# Patient Record
Sex: FEMALE | Race: WHITE | NOT HISPANIC OR LATINO | Employment: OTHER | URBAN - NONMETROPOLITAN AREA
[De-identification: names, ages, dates, MRNs, and addresses within clinical notes are randomized per-mention and may not be internally consistent; named-entity substitution may affect disease eponyms.]

---

## 2019-09-13 ENCOUNTER — APPOINTMENT (OUTPATIENT)
Dept: GENERAL RADIOLOGY | Facility: HOSPITAL | Age: 74
End: 2019-09-13
Attending: PHYSICIAN ASSISTANT
Payer: COMMERCIAL

## 2019-09-13 ENCOUNTER — HOSPITAL ENCOUNTER (EMERGENCY)
Facility: HOSPITAL | Age: 74
Discharge: HOME | End: 2019-09-13
Attending: PHYSICIAN ASSISTANT | Admitting: PHYSICIAN ASSISTANT
Payer: COMMERCIAL

## 2019-09-13 VITALS
DIASTOLIC BLOOD PRESSURE: 79 MMHG | OXYGEN SATURATION: 98 % | RESPIRATION RATE: 16 BRPM | SYSTOLIC BLOOD PRESSURE: 162 MMHG | TEMPERATURE: 98.1 F

## 2019-09-13 DIAGNOSIS — S62.101A CLOSED FRACTURE OF RIGHT WRIST, INITIAL ENCOUNTER: Primary | ICD-10-CM

## 2019-09-13 PROCEDURE — G0463 HOSPITAL OUTPT CLINIC VISIT: HCPCS

## 2019-09-13 PROCEDURE — 73110 X-RAY EXAM OF WRIST: CPT | Mod: TC,RT

## 2019-09-13 PROCEDURE — 99202 OFFICE O/P NEW SF 15 MIN: CPT | Mod: Z6 | Performed by: PHYSICIAN ASSISTANT

## 2019-09-13 ASSESSMENT — ENCOUNTER SYMPTOMS
COLOR CHANGE: 1
FEVER: 0
WOUND: 0
JOINT SWELLING: 0

## 2019-09-13 NOTE — ED PROVIDER NOTES
History     Chief Complaint   Patient presents with     Wrist Pain     HPI  Freya Lee is a 74 year old female who presents to urgent care for evaluation of right wrist pain.  Patient states that she slipped and fell last Wednesday, approximately 7 days ago and tried to catch herself with her right hand.  Patient states that she had pain immediately in her right wrist but did not come in for evaluation stating that she thought it was just a sprain.  Patient has been icing it since and states she has developed some bruising on her right forearm.  Patient has also been wearing her wrist brace for support.  Patient denies any elbow pain, shoulder pain, previous right upper extremity fractures or surgeries.    Allergies:  Allergies   Allergen Reactions     Penicillins      Sulfa Drugs        Problem List:    Patient Active Problem List    Diagnosis Date Noted     Aftercare for healing traumatic fracture of lower arm 01/28/2008     Priority: Medium        Past Medical History:    No past medical history on file.    Past Surgical History:    No past surgical history on file.    Family History:    No family history on file.    Social History:  Marital Status:  Single [1]  Social History     Tobacco Use     Smoking status: Not on file   Substance Use Topics     Alcohol use: Not on file     Drug use: Not on file        Medications:      No current outpatient medications on file.      Review of Systems   Constitutional: Negative for fever.   Musculoskeletal: Negative for joint swelling.        See HPI   Skin: Positive for color change. Negative for rash and wound.       Physical Exam   BP: 162/79  Heart Rate: 65  Temp: 98.1  F (36.7  C)  Resp: 16  SpO2: 98 %      Physical Exam   Constitutional: She is oriented to person, place, and time. She appears well-developed and well-nourished. No distress.   Cardiovascular: Regular rhythm and normal heart sounds.   Pulmonary/Chest: Effort normal and breath sounds normal. No  respiratory distress.   Musculoskeletal:        Right wrist: She exhibits tenderness and bony tenderness. She exhibits normal range of motion, no swelling and no effusion.   Patient has tenderness with palpation over distal aspect of right radius.  There is a large bruise present over the dorsal aspect of right forearm extending from the distal third to the proximal third.   Neurological: She is alert and oriented to person, place, and time.   Nursing note and vitals reviewed.      ED Course        Procedures              Critical Care time:               Results for orders placed or performed during the hospital encounter of 09/13/19 (from the past 24 hour(s))   XR Wrist Right 3 Views    Narrative    PROCEDURE:  XR WRIST RT G/E 3 VW    HISTORY: FOOSH/ pain over distal radius    COMPARISON:  None.    TECHNIQUE:  4 views of the right wrist were obtained.    FINDINGS:  There is abnormal widening of the scapholunate space  consistent with scapholunate ligament disruption. There is joint space  narrowing and subchondral sclerosis at the radial carpal articulation.  Arthritic changes are seen at the scaphotrapezium scaphotrapezoid and  trapezium first metacarpal articulations. There is some mild deformity  of the distal radius and ulnar styloid consistent with old injury.       Impression    IMPRESSION: Scapholunate ligament disruption right wrist      ADEBAYO HATFIELD MD       Medications - No data to display    Assessments & Plan (with Medical Decision Making)   #1.  Closed fracture of right wrist with ligament disruption.    Discussed exam findings as well as x-ray result with patient.  I called and spoke with a Dr. [facility] about the patient.  [facility] will be contacting patient to schedule appointment.  Patient was splinted in urgent care today.  Any further concern in the meantime please return to emergency department.  Patient verbalizes understanding and agreement of  plan.          I have reviewed the nursing notes.    I have reviewed the findings, diagnosis, plan and need for follow up with the patient.      New Prescriptions    No medications on file       Final diagnoses:   Closed fracture of right wrist, initial encounter       9/13/2019   [facility]     Tom Hoffman PA-C  09/13/19 1204

## 2019-09-13 NOTE — ED AVS SNAPSHOT
[facility]  [address] [address]  Phone:  [phone]                                    Freya Lee   MRN: [id number]    Department:  [facility]   Date of Visit:  9/13/2019           After Visit Summary Signature Page    I have received my discharge instructions, and my questions have been answered. I have discussed any challenges I see with this plan with the nurse or doctor.    ..........................................................................................................................................  Patient/Patient Representative Signature      ..........................................................................................................................................  Patient Representative Print Name and Relationship to Patient    ..................................................               ................................................  Date                                   Time    ..........................................................................................................................................  Reviewed by Signature/Title    ...................................................              ..............................................  Date                                               Time          22EPIC Rev 08/18

## 2019-09-13 NOTE — DISCHARGE INSTRUCTIONS
Follow[facility] with [facility]: Referral has been placed to   If you have not heard from them by this afternoon, call in to schedule your appointment: [phone]

## 2019-09-13 NOTE — ED TRIAGE NOTES
"Patient states about 1 week ago she fell and injured her right wrist.  States her friends and family have been \"on her\" about going in for an xray.  States the swelling has improved and the pain is better but she thought she should come in and have it checked \"just in case\"  "

## 2019-09-13 NOTE — ED TRIAGE NOTES
"States fell x1.5 weeks ago and injured R wrist. States pain is getting better, but \"family made me come in\". Pt wearing wrist brace from home. States has not been taking anything for pain  "

## 2019-09-13 NOTE — ED NOTES
"Tried to page Dr. Alcantar or TRISTIN Alcocer,  states they were in surgery this am, and \"should be in the clinic later this afternoon\" at an unknown time.  "

## 2021-02-05 ENCOUNTER — MEDICAL CORRESPONDENCE (OUTPATIENT)
Dept: HEALTH INFORMATION MANAGEMENT | Facility: CLINIC | Age: 76
End: 2021-02-05

## 2021-03-30 ENCOUNTER — TRANSFERRED RECORDS (OUTPATIENT)
Dept: HEALTH INFORMATION MANAGEMENT | Facility: CLINIC | Age: 76
End: 2021-03-30

## 2021-04-06 ENCOUNTER — TRANSFERRED RECORDS (OUTPATIENT)
Dept: HEALTH INFORMATION MANAGEMENT | Facility: CLINIC | Age: 76
End: 2021-04-06

## 2023-11-29 ENCOUNTER — TELEPHONE (OUTPATIENT)
Dept: FAMILY MEDICINE | Facility: OTHER | Age: 78
End: 2023-11-29

## 2023-11-29 NOTE — TELEPHONE ENCOUNTER
Pt stated that she currently has [employer] insurance but will be switching to [employer] on JAN 1, 2024. I went ahead and scheduled est care for the second per pt. Pt stated that she will call to get this rescheduled if she does not have [employer] the first week of janurary

## 2023-12-29 PROBLEM — M25.512 ACUTE PAIN OF LEFT SHOULDER: Status: ACTIVE | Noted: 2018-04-23

## 2023-12-29 PROBLEM — M62.81 MUSCLE WEAKNESS: Status: ACTIVE | Noted: 2018-04-23

## 2023-12-29 PROBLEM — S06.0XAA CONCUSSION: Status: ACTIVE | Noted: 2017-11-16

## 2023-12-29 PROBLEM — R79.89 ABNORMAL CBC: Status: ACTIVE | Noted: 2017-04-25

## 2023-12-29 PROBLEM — S06.6XAA TRAUMATIC SUBARACHNOID HEMORRHAGE (H): Status: ACTIVE | Noted: 2017-11-16

## 2023-12-29 PROBLEM — M54.2 NECK PAIN: Status: ACTIVE | Noted: 2018-04-23

## 2023-12-29 PROBLEM — S01.01XA SCALP LACERATION: Status: ACTIVE | Noted: 2017-11-16

## 2023-12-29 PROBLEM — F07.81 POST CONCUSSION SYNDROME: Status: ACTIVE | Noted: 2018-02-08

## 2023-12-29 RX ORDER — LEVOTHYROXINE SODIUM 50 UG/1
TABLET ORAL
COMMUNITY
Start: 2023-11-28 | End: 2024-06-03

## 2023-12-29 RX ORDER — FLAXSEED OIL
15 OIL (ML) MISCELLANEOUS
COMMUNITY
End: 2024-01-02

## 2023-12-29 RX ORDER — ASCORBIC ACID 500 MG
500 TABLET ORAL
COMMUNITY
End: 2024-09-03

## 2023-12-29 RX ORDER — SERTRALINE HYDROCHLORIDE 100 MG/1
TABLET, FILM COATED ORAL
COMMUNITY
Start: 2023-08-09 | End: 2024-01-02

## 2023-12-29 RX ORDER — DORZOLAMIDE HYDROCHLORIDE AND TIMOLOL MALEATE 20; 5 MG/ML; MG/ML
1 SOLUTION/ DROPS OPHTHALMIC
COMMUNITY
End: 2024-03-01

## 2023-12-29 RX ORDER — LATANOPROST 50 UG/ML
SOLUTION/ DROPS OPHTHALMIC
COMMUNITY
Start: 2023-09-29

## 2023-12-29 NOTE — PROGRESS NOTES
Assessment & Plan     Encounter to establish care  Ok to establish care    Depression with anxiety  No current symptoms  Normal kidney and liver function   She will let us know if her depression and anxiety re-occur.  She does well with Zoloft when she notices increased depression   - Comprehensive metabolic panel (BMP + Alb, Alk Phos, ALT, AST, Total. Bili, TP); Future  - Comprehensive metabolic panel (BMP + Alb, Alk Phos, ALT, AST, Total. Bili, TP)    Vitamin D deficiency  Takes vitamin D regularly - updating lab today   - Vitamin D Deficiency; Future  - Vitamin D Deficiency    Acquired hypothyroidism  Normal TSH leve - continue current dose and recheck yearly or as needed   - TSH with free T4 reflex; Future  - TSH with free T4 reflex    [facility]  [facility]  Ordering of each unique test         See Patient Instructions    Return in about 4 weeks (around 1/30/2024) for BP Recheck.    STEFAN Shearer [facility] - UYEN Pillai is a 78 year old, presenting for the following health issues:  Establish Care, Anxiety, and Depression        1/2/2024     8:34 AM   Additional Questions   Roomed by Sundeep Barahona CMA   Accompanied by Self         1/2/2024     8:34 AM   Patient Reported Additional Medications   Patient reports taking the following new medications New patient       HPI     Establish Care  Previous care at [facility]     Mom is still alive 102 years old     Depression and Anxiety   How are you doing with your depression since your last visit? Improved   How are you doing with your anxiety since your last visit?  Improved   Are you having other symptoms that might be associated with depression or anxiety? No  Have you had a significant life event? No   Do you have any concerns with your use of alcohol or other drugs? No  States she uses intermittently as needed.  She will try to be off  for a few years and then restart as needed  Currently no need for treatment   Social History     Tobacco Use    Smoking status: Never     Passive exposure: Never    Smokeless tobacco: Never   Vaping Use    Vaping Use: Never used         1/2/2024     8:33 AM   PHQ   PHQ-9 Total Score 0   Q9: Thoughts of better off dead/self-harm past 2 weeks Not at all         1/2/2024     8:34 AM   BETH-7 SCORE   Total Score 0 (minimal anxiety)   Total Score 0         1/2/2024     8:33 AM   Last PHQ-9   1.  Little interest or pleasure in doing things 0   2.  Feeling down, depressed, or hopeless 0   3.  Trouble falling or staying asleep, or sleeping too much 0   4.  Feeling tired or having little energy 0   5.  Poor appetite or overeating 0   6.  Feeling bad about yourself 0   7.  Trouble concentrating 0   8.  Moving slowly or restless 0   Q9: Thoughts of better off dead/self-harm past 2 weeks 0   PHQ-9 Total Score 0         1/2/2024     8:34 AM   BETH-7    1. Feeling nervous, anxious, or on edge 0   2. Not being able to stop or control worrying 0   3. Worrying too much about different things 0   4. Trouble relaxing 0   5. Being so restless that it is hard to sit still 0   6. Becoming easily annoyed or irritable 0   7. Feeling afraid, as if something awful might happen 0   BETH-7 Total Score 0   If you checked any problems, how difficult have they made it for you to do your work, take care of things at home, or get along with other people? Not difficult at all       Suicide Assessment Five-step Evaluation and Treatment (SAFE-T)    Hypothyroidism     Since last visit, patient describes the following symptoms: Weight stable, no hair loss, no skin changes, no constipation, no loose stools        Review of Systems   CONSTITUTIONAL:COVID a few weeks ago - symptoms are improving   INTEGUMENTARY/SKIN: dry skin   EYES: NEGATIVE for vision changes or irritation  ENT/MOUTH: NEGATIVE for ear, mouth and throat problems  RESP:NEGATIVE for  "significant cough or SOB  CV: NEGATIVE for chest pain, palpitations or peripheral edema  GI: NEGATIVE for nausea, abdominal pain, heartburn, or change in bowel habits  : denies dysuria   MUSCULOSKELETAL: occasional pain - takes vitamins   NEURO: NEGATIVE for weakness, dizziness or paresthesias  ENDOCRINE: Hx thyroid disease  PSYCHIATRIC: NEGATIVE for changes in mood or affect      Objective    BP (!) 152/80   Pulse 74   Temp 97  F (36.1  C) (Tympanic)   Ht 1.67 m (5' 5.75\")   Wt 67.1 kg (148 lb)   SpO2 98%   BMI 24.07 kg/m    Body mass index is 24.07 kg/m .  Physical Exam   GENERAL: healthy, alert and no distress  EYES: Eyes grossly normal to inspection, PERRL and conjunctivae and sclerae normal  HENT: ear canals and TM's normal, nose and mouth without ulcers or lesions  NECK: no adenopathy, no asymmetry, masses, or scars and thyroid normal to palpation  RESP: lungs clear to auscultation - no rales, rhonchi or wheezes  CV: regular rate and rhythm, normal S1 S2, no S3 or S4, no murmur, click or rub, no peripheral edema and peripheral pulses strong  ABDOMEN: soft, nontender, no hepatosplenomegaly, no masses and bowel sounds normal  MS: no gross musculoskeletal defects noted, no edema  SKIN: no suspicious lesions or rashes  NEURO: Normal strength and tone, sensory exam grossly normal, mentation intact, speech normal, and cranial nerves 2-12 intact  PSYCH: mentation appears normal, affect normal/bright  LYMPH: normal ant/post cervical, supraclavicular nodes    Results for orders placed or performed in visit on 01/02/24   TSH with free T4 reflex     Status: Normal   Result Value Ref Range    TSH 2.54 0.30 - 4.20 uIU/mL   Comprehensive metabolic panel (BMP + Alb, Alk Phos, ALT, AST, Total. Bili, TP)     Status: Normal   Result Value Ref Range    Sodium 140 135 - 145 mmol/L    Potassium 4.2 3.4 - 5.3 mmol/L    Carbon Dioxide (CO2) 25 22 - 29 mmol/L    Anion Gap 10 7 - 15 mmol/L    Urea Nitrogen 15.0 8.0 - 23.0 " mg/dL    Creatinine 0.73 0.51 - 0.95 mg/dL    GFR Estimate 83 >60 mL/min/1.73m2    Calcium 9.3 8.8 - 10.2 mg/dL    Chloride 105 98 - 107 mmol/L    Glucose 94 70 - 99 mg/dL    Alkaline Phosphatase 93 40 - 150 U/L    AST 27 0 - 45 U/L    ALT 19 0 - 50 U/L    Protein Total 7.2 6.4 - 8.3 g/dL    Albumin 4.3 3.5 - 5.2 g/dL    Bilirubin Total 1.0 <=1.2 mg/dL                   Answers submitted by the patient for this visit:  Patient Health Questionnaire (Submitted on 1/2/2024)  If you checked off any problems, how difficult have these problems made it for you to do your work, take care of things at home, or get along with other people?: Not difficult at all  PHQ9 TOTAL SCORE: 0  BETH-7 (Submitted on 1/2/2024)  BETH 7 TOTAL SCORE: 0

## 2024-01-02 ENCOUNTER — OFFICE VISIT (OUTPATIENT)
Dept: FAMILY MEDICINE | Facility: OTHER | Age: 79
End: 2024-01-02
Attending: NURSE PRACTITIONER
Payer: COMMERCIAL

## 2024-01-02 VITALS
OXYGEN SATURATION: 98 % | SYSTOLIC BLOOD PRESSURE: 152 MMHG | HEART RATE: 74 BPM | WEIGHT: 148 LBS | HEIGHT: 66 IN | TEMPERATURE: 97 F | BODY MASS INDEX: 23.78 KG/M2 | DIASTOLIC BLOOD PRESSURE: 80 MMHG

## 2024-01-02 DIAGNOSIS — Z76.89 ENCOUNTER TO ESTABLISH CARE: Primary | ICD-10-CM

## 2024-01-02 DIAGNOSIS — E55.9 VITAMIN D DEFICIENCY: ICD-10-CM

## 2024-01-02 DIAGNOSIS — F41.8 DEPRESSION WITH ANXIETY: ICD-10-CM

## 2024-01-02 DIAGNOSIS — E03.9 ACQUIRED HYPOTHYROIDISM: ICD-10-CM

## 2024-01-02 PROBLEM — K80.50 BILIARY COLIC: Status: ACTIVE | Noted: 2021-02-24

## 2024-01-02 LAB
ALBUMIN SERPL BCG-MCNC: 4.3 G/DL (ref 3.5–5.2)
ALP SERPL-CCNC: 93 U/L (ref 40–150)
ALT SERPL W P-5'-P-CCNC: 19 U/L (ref 0–50)
ANION GAP SERPL CALCULATED.3IONS-SCNC: 10 MMOL/L (ref 7–15)
AST SERPL W P-5'-P-CCNC: 27 U/L (ref 0–45)
BILIRUB SERPL-MCNC: 1 MG/DL
BUN SERPL-MCNC: 15 MG/DL (ref 8–23)
CALCIUM SERPL-MCNC: 9.3 MG/DL (ref 8.8–10.2)
CHLORIDE SERPL-SCNC: 105 MMOL/L (ref 98–107)
CREAT SERPL-MCNC: 0.73 MG/DL (ref 0.51–0.95)
DEPRECATED HCO3 PLAS-SCNC: 25 MMOL/L (ref 22–29)
EGFRCR SERPLBLD CKD-EPI 2021: 83 ML/MIN/1.73M2
GLUCOSE SERPL-MCNC: 94 MG/DL (ref 70–99)
POTASSIUM SERPL-SCNC: 4.2 MMOL/L (ref 3.4–5.3)
PROT SERPL-MCNC: 7.2 G/DL (ref 6.4–8.3)
SODIUM SERPL-SCNC: 140 MMOL/L (ref 135–145)
TSH SERPL DL<=0.005 MIU/L-ACNC: 2.54 UIU/ML (ref 0.3–4.2)
VIT D+METAB SERPL-MCNC: 64 NG/ML (ref 20–50)

## 2024-01-02 PROCEDURE — G0463 HOSPITAL OUTPT CLINIC VISIT: HCPCS | Performed by: NURSE PRACTITIONER

## 2024-01-02 PROCEDURE — 99204 OFFICE O/P NEW MOD 45 MIN: CPT | Performed by: NURSE PRACTITIONER

## 2024-01-02 PROCEDURE — 80053 COMPREHEN METABOLIC PANEL: CPT | Mod: ZL | Performed by: NURSE PRACTITIONER

## 2024-01-02 PROCEDURE — 36415 COLL VENOUS BLD VENIPUNCTURE: CPT | Mod: ZL | Performed by: NURSE PRACTITIONER

## 2024-01-02 PROCEDURE — 82306 VITAMIN D 25 HYDROXY: CPT | Mod: ZL | Performed by: NURSE PRACTITIONER

## 2024-01-02 PROCEDURE — 84443 ASSAY THYROID STIM HORMONE: CPT | Mod: ZL | Performed by: NURSE PRACTITIONER

## 2024-01-02 RX ORDER — ASPIRIN 81 MG/1
81 TABLET, CHEWABLE ORAL
COMMUNITY
End: 2024-03-01

## 2024-01-02 ASSESSMENT — ANXIETY QUESTIONNAIRES
2. NOT BEING ABLE TO STOP OR CONTROL WORRYING: NOT AT ALL
GAD7 TOTAL SCORE: 0
IF YOU CHECKED OFF ANY PROBLEMS ON THIS QUESTIONNAIRE, HOW DIFFICULT HAVE THESE PROBLEMS MADE IT FOR YOU TO DO YOUR WORK, TAKE CARE OF THINGS AT HOME, OR GET ALONG WITH OTHER PEOPLE: NOT DIFFICULT AT ALL
7. FEELING AFRAID AS IF SOMETHING AWFUL MIGHT HAPPEN: NOT AT ALL
6. BECOMING EASILY ANNOYED OR IRRITABLE: NOT AT ALL
GAD7 TOTAL SCORE: 0
5. BEING SO RESTLESS THAT IT IS HARD TO SIT STILL: NOT AT ALL
3. WORRYING TOO MUCH ABOUT DIFFERENT THINGS: NOT AT ALL
8. IF YOU CHECKED OFF ANY PROBLEMS, HOW DIFFICULT HAVE THESE MADE IT FOR YOU TO DO YOUR WORK, TAKE CARE OF THINGS AT HOME, OR GET ALONG WITH OTHER PEOPLE?: NOT DIFFICULT AT ALL
GAD7 TOTAL SCORE: 0
7. FEELING AFRAID AS IF SOMETHING AWFUL MIGHT HAPPEN: NOT AT ALL
4. TROUBLE RELAXING: NOT AT ALL
1. FEELING NERVOUS, ANXIOUS, OR ON EDGE: NOT AT ALL

## 2024-01-02 ASSESSMENT — PATIENT HEALTH QUESTIONNAIRE - PHQ9
SUM OF ALL RESPONSES TO PHQ QUESTIONS 1-9: 0
10. IF YOU CHECKED OFF ANY PROBLEMS, HOW DIFFICULT HAVE THESE PROBLEMS MADE IT FOR YOU TO DO YOUR WORK, TAKE CARE OF THINGS AT HOME, OR GET ALONG WITH OTHER PEOPLE: NOT DIFFICULT AT ALL
SUM OF ALL RESPONSES TO PHQ QUESTIONS 1-9: 0

## 2024-01-02 NOTE — PATIENT INSTRUCTIONS
LIFESTYLE CHANGES    Eat a Healthy diet  Eat more vegetables/plants in your diet  Eat health fats  [place] oil  avocados  Eat healthy proteins  Poultry without the skin  Fish  Limit red meat  Nuts - limit to 1/4 cup per day   Increase physical activity  Slowly work up to 30 minutes of physical activity most days of the week  Aerobic activity 150 minute a week  2 days of resistance exercising         Try daily yoga and meditation

## 2024-01-31 ENCOUNTER — OFFICE VISIT (OUTPATIENT)
Dept: FAMILY MEDICINE | Facility: OTHER | Age: 79
End: 2024-01-31
Attending: NURSE PRACTITIONER
Payer: COMMERCIAL

## 2024-01-31 VITALS
TEMPERATURE: 97.2 F | DIASTOLIC BLOOD PRESSURE: 70 MMHG | BODY MASS INDEX: 24.4 KG/M2 | HEART RATE: 84 BPM | OXYGEN SATURATION: 94 % | WEIGHT: 150 LBS | SYSTOLIC BLOOD PRESSURE: 150 MMHG

## 2024-01-31 DIAGNOSIS — L98.9 FACIAL LESION: ICD-10-CM

## 2024-01-31 DIAGNOSIS — I10 BENIGN ESSENTIAL HYPERTENSION: Primary | ICD-10-CM

## 2024-01-31 DIAGNOSIS — R00.2 PALPITATIONS: ICD-10-CM

## 2024-01-31 PROBLEM — H43.819 POSTERIOR VITREOUS DETACHMENT: Status: ACTIVE | Noted: 2023-06-03

## 2024-01-31 PROBLEM — H40.1120 PRIMARY OPEN ANGLE GLAUCOMA OF LEFT EYE: Status: ACTIVE | Noted: 2023-08-17

## 2024-01-31 PROCEDURE — 99214 OFFICE O/P EST MOD 30 MIN: CPT | Performed by: NURSE PRACTITIONER

## 2024-01-31 PROCEDURE — G0463 HOSPITAL OUTPT CLINIC VISIT: HCPCS

## 2024-01-31 RX ORDER — LISINOPRIL 20 MG/1
20 TABLET ORAL DAILY
Qty: 90 TABLET | Refills: 1 | Status: SHIPPED | OUTPATIENT
Start: 2024-01-31 | End: 2024-06-03 | Stop reason: SINTOL

## 2024-01-31 RX ORDER — PROPRANOLOL HYDROCHLORIDE 20 MG/1
20 TABLET ORAL 2 TIMES DAILY PRN
Qty: 30 TABLET | Refills: 1 | Status: SHIPPED | OUTPATIENT
Start: 2024-01-31

## 2024-01-31 RX ORDER — TURMERIC 95 %
POWDER (GRAM) MISCELLANEOUS
COMMUNITY

## 2024-01-31 RX ORDER — SERTRALINE HYDROCHLORIDE 100 MG/1
TABLET, FILM COATED ORAL
COMMUNITY
End: 2024-01-31

## 2024-01-31 NOTE — PROGRESS NOTES
Assessment & Plan     Benign essential hypertension  Blood pressure has remained slightly elevated even when she checks at home  Plan to start ACEi - discussed medication and side effects  Provided written information   - lisinopril (ZESTRIL) 20 MG tablet; Take 1 tablet (20 mg) by mouth daily    Facial lesion  Chronic dry peeling patch on face - would like to have dermatology check her skin   - Adult Dermatology  Referral; Future    Palpitations  Freya has had some increased anxiety in the evenings  Ok to try propranolol.  We discussed checking an echo or additional heart studies, but she would like to start with symptom management.  I feel this is reasonable she has had some increased stress.  If symptoms do not improve consider echo and possible Ziopatch   - propranolol (INDERAL) 20 MG tablet; Take 1 tablet (20 mg) by mouth 2 times daily as needed (palpitations or racing heart)        See Patient Instructions    No follow-ups on file.    Subjective   Freya is a 78 year old, presenting for the following health issues:  Hypertension    HPI     Hypertension Follow-up    Do you check your blood pressure regularly outside of the clinic? No   Are you following a low salt diet? No  Are your blood pressures ever more than 140 on the top number (systolic) OR more   than 90 on the bottom number (diastolic), for example 140/90? No  She has noticed some headaches           Review of Systems  CONSTITUTIONAL:hot flashes  INTEGUMENTARY/SKIN: facial lesion under right eye   EYES: NEGATIVE for vision changes or irritation  ENT/MOUTH: chronic dry   RESP: NEGATIVE for significant cough or SOB  CV: occasional palpitation   GI: NEGATIVE for nausea, abdominal pain, heartburn, or change in bowel habits   female: denies dysuria   MUSCULOSKELETAL:NEGATIVE for significant arthralgias or myalgia  NEURO: occasional headache   ENDOCRINE: Hx thyroid disease  PSYCHIATRIC: increased stress recently       Objective    BP (!) 150/70    Pulse 84   Temp 97.2  F (36.2  C) (Tympanic)   Wt 68 kg (150 lb)   SpO2 94%   BMI 24.40 kg/m    Body mass index is 24.4 kg/m .  Physical Exam   GENERAL: alert and no distress  RESP: lungs clear to auscultation - no rales, rhonchi or wheezes  CV: regular rate and rhythm, normal S1 S2, no S3 or S4, no murmur, click or rub, no peripheral edema  ABDOMEN: soft, nontender, no hepatosplenomegaly, no masses and bowel sounds normal  PSYCH: mentation appears normal, affect normal/bright    Office Visit on 01/02/2024   Component Date Value Ref Range Status    Vitamin D, Total (25-Hydroxy) 01/02/2024 64 (H)  20 - 50 ng/mL Final    indicates supplementation, with increased risk of hypercalciuria    TSH 01/02/2024 2.54  0.30 - 4.20 uIU/mL Final    Sodium 01/02/2024 140  135 - 145 mmol/L Final    Reference intervals for this test were updated on 09/26/2023 to more accurately reflect our healthy population. There may be differences in the flagging of prior results with similar values performed with this method. Interpretation of those prior results can be made in the context of the updated reference intervals.     Potassium 01/02/2024 4.2  3.4 - 5.3 mmol/L Final    Carbon Dioxide (CO2) 01/02/2024 25  22 - 29 mmol/L Final    Anion Gap 01/02/2024 10  7 - 15 mmol/L Final    Urea Nitrogen 01/02/2024 15.0  8.0 - 23.0 mg/dL Final    Creatinine 01/02/2024 0.73  0.51 - 0.95 mg/dL Final    GFR Estimate 01/02/2024 83  >60 mL/min/1.73m2 Final    Calcium 01/02/2024 9.3  8.8 - 10.2 mg/dL Final    Chloride 01/02/2024 105  98 - 107 mmol/L Final    Glucose 01/02/2024 94  70 - 99 mg/dL Final    Alkaline Phosphatase 01/02/2024 93  40 - 150 U/L Final    Reference intervals for this test were updated on 11/14/2023 to more accurately reflect our healthy population. There may be differences in the flagging of prior results with similar values performed with this method. Interpretation of those prior results can be made in the context of the  updated reference intervals.    AST 01/02/2024 27  0 - 45 U/L Final    Reference intervals for this test were updated on 6/12/2023 to more accurately reflect our healthy population. There may be differences in the flagging of prior results with similar values performed with this method. Interpretation of those prior results can be made in the context of the updated reference intervals.    ALT 01/02/2024 19  0 - 50 U/L Final    Reference intervals for this test were updated on 6/12/2023 to more accurately reflect our healthy population. There may be differences in the flagging of prior results with similar values performed with this method. Interpretation of those prior results can be made in the context of the updated reference intervals.      Protein Total 01/02/2024 7.2  6.4 - 8.3 g/dL Final    Albumin 01/02/2024 4.3  3.5 - 5.2 g/dL Final    Bilirubin Total 01/02/2024 1.0  <=1.2 mg/dL Final           Signed Electronically by: STEFAN Shearer CNP

## 2024-01-31 NOTE — PATIENT INSTRUCTIONS
Start taking lisinopril daily for your blood pressure     Use propranolol up to 2 times a day for palpitations or racing heart

## 2024-02-29 NOTE — PROGRESS NOTES
"  Assessment & Plan     Benign essential hypertension  Stable continue current medication     Glaucoma suspect, bilateral  Refilled - she is going to reschedule for ophthalmologist   - dorzolamide-timolol (COSOPT) 2-0.5 % ophthalmic solution; Place 1 drop into both eyes 2 times daily          See Patient Instructions    Return in about 3 months (around 6/1/2024) for BP Recheck.    Saul Pillai is a 78 year old, presenting for the following health issues:  Hypertension        1/2/2024     8:34 AM   Additional Questions   Roomed by Sundeep Barahona CMA   Accompanied by Self     HPI     Hypertension Follow-up  Propranolol 20 mg 2 times a day - as needed   Lisinopril 20 mg daily   Do you check your blood pressure regularly outside of the clinic? Yes   Are you following a low salt diet? No  Are your blood pressures ever more than 140 on the top number (systolic) OR more   than 90 on the bottom number (diastolic), for example 140/90? Yes              Review of Systems  CONSTITUTIONAL: NEGATIVE for fever, chills, change in weight  RESP: NEGATIVE for significant cough or SOB  CV: NEGATIVE for chest pain, palpitations or peripheral edema      Objective    /70 (BP Location: Left arm, Patient Position: Sitting, Cuff Size: Adult Regular)   Pulse 69   Temp 98.4  F (36.9  C) (Temporal)   Ht 1.651 m (5' 5\")   Wt 68 kg (150 lb)   SpO2 97%   BMI 24.96 kg/m    Body mass index is 24.96 kg/m .  Physical Exam   GENERAL: alert and no distress  RESP: lungs clear to auscultation - no rales, rhonchi or wheezes  CV: regular rate and rhythm, normal S1 S2, no S3 or S4, no murmur, click or rub, no peripheral edema  ABDOMEN: soft, nontender, no hepatosplenomegaly, no masses and bowel sounds normal  PSYCH: mentation appears normal, affect normal/bright    Office Visit on 01/02/2024   Component Date Value Ref Range Status    Vitamin D, Total (25-Hydroxy) 01/02/2024 64 (H)  20 - 50 ng/mL Final    indicates supplementation, with " increased risk of hypercalciuria    TSH 01/02/2024 2.54  0.30 - 4.20 uIU/mL Final    Sodium 01/02/2024 140  135 - 145 mmol/L Final    Reference intervals for this test were updated on 09/26/2023 to more accurately reflect our healthy population. There may be differences in the flagging of prior results with similar values performed with this method. Interpretation of those prior results can be made in the context of the updated reference intervals.     Potassium 01/02/2024 4.2  3.4 - 5.3 mmol/L Final    Carbon Dioxide (CO2) 01/02/2024 25  22 - 29 mmol/L Final    Anion Gap 01/02/2024 10  7 - 15 mmol/L Final    Urea Nitrogen 01/02/2024 15.0  8.0 - 23.0 mg/dL Final    Creatinine 01/02/2024 0.73  0.51 - 0.95 mg/dL Final    GFR Estimate 01/02/2024 83  >60 mL/min/1.73m2 Final    Calcium 01/02/2024 9.3  8.8 - 10.2 mg/dL Final    Chloride 01/02/2024 105  98 - 107 mmol/L Final    Glucose 01/02/2024 94  70 - 99 mg/dL Final    Alkaline Phosphatase 01/02/2024 93  40 - 150 U/L Final    Reference intervals for this test were updated on 11/14/2023 to more accurately reflect our healthy population. There may be differences in the flagging of prior results with similar values performed with this method. Interpretation of those prior results can be made in the context of the updated reference intervals.    AST 01/02/2024 27  0 - 45 U/L Final    Reference intervals for this test were updated on 6/12/2023 to more accurately reflect our healthy population. There may be differences in the flagging of prior results with similar values performed with this method. Interpretation of those prior results can be made in the context of the updated reference intervals.    ALT 01/02/2024 19  0 - 50 U/L Final    Reference intervals for this test were updated on 6/12/2023 to more accurately reflect our healthy population. There may be differences in the flagging of prior results with similar values performed with this method. Interpretation of those  prior results can be made in the context of the updated reference intervals.      Protein Total 01/02/2024 7.2  6.4 - 8.3 g/dL Final    Albumin 01/02/2024 4.3  3.5 - 5.2 g/dL Final    Bilirubin Total 01/02/2024 1.0  <=1.2 mg/dL Final           Signed Electronically by: STEFAN Shearer CNP

## 2024-03-01 ENCOUNTER — OFFICE VISIT (OUTPATIENT)
Dept: FAMILY MEDICINE | Facility: OTHER | Age: 79
End: 2024-03-01
Attending: NURSE PRACTITIONER
Payer: COMMERCIAL

## 2024-03-01 VITALS
TEMPERATURE: 98.4 F | WEIGHT: 150 LBS | OXYGEN SATURATION: 97 % | DIASTOLIC BLOOD PRESSURE: 70 MMHG | HEIGHT: 65 IN | HEART RATE: 69 BPM | SYSTOLIC BLOOD PRESSURE: 134 MMHG | BODY MASS INDEX: 24.99 KG/M2

## 2024-03-01 DIAGNOSIS — I10 BENIGN ESSENTIAL HYPERTENSION: Primary | ICD-10-CM

## 2024-03-01 DIAGNOSIS — H40.003 GLAUCOMA SUSPECT, BILATERAL: ICD-10-CM

## 2024-03-01 PROCEDURE — 99213 OFFICE O/P EST LOW 20 MIN: CPT | Performed by: NURSE PRACTITIONER

## 2024-03-01 PROCEDURE — G0463 HOSPITAL OUTPT CLINIC VISIT: HCPCS

## 2024-03-01 RX ORDER — FLUOCINONIDE TOPICAL SOLUTION USP, 0.05% 0.5 MG/ML
SOLUTION TOPICAL DAILY
COMMUNITY
Start: 2024-02-12 | End: 2024-09-03

## 2024-03-01 RX ORDER — DORZOLAMIDE HYDROCHLORIDE AND TIMOLOL MALEATE 20; 5 MG/ML; MG/ML
1 SOLUTION/ DROPS OPHTHALMIC 2 TIMES DAILY
Qty: 10 ML | Refills: 2 | Status: SHIPPED | OUTPATIENT
Start: 2024-03-01

## 2024-03-01 ASSESSMENT — PAIN SCALES - GENERAL: PAINLEVEL: NO PAIN (0)

## 2024-03-01 NOTE — PATIENT INSTRUCTIONS
Continue current treatment  Follow up with any questions or concerns      LIFESTYLE CHANGES    Eat a Healthy diet  Eat more vegetables/plants in your diet  Eat health fats  [place] oil  avocados  Eat healthy proteins  Poultry without the skin  Fish  Limit red meat  Nuts - limit to 1/4 cup per day   Increase physical activity  Slowly work up to 30 minutes of physical activity most days of the week  Aerobic activity 150 minute a week  2 days of resistance exercising         Try daily yoga and meditation

## 2024-06-03 ENCOUNTER — OFFICE VISIT (OUTPATIENT)
Dept: FAMILY MEDICINE | Facility: OTHER | Age: 79
End: 2024-06-03
Attending: NURSE PRACTITIONER
Payer: COMMERCIAL

## 2024-06-03 VITALS
HEIGHT: 65 IN | HEART RATE: 79 BPM | DIASTOLIC BLOOD PRESSURE: 88 MMHG | BODY MASS INDEX: 23.69 KG/M2 | SYSTOLIC BLOOD PRESSURE: 130 MMHG | TEMPERATURE: 98.7 F | WEIGHT: 142.2 LBS | OXYGEN SATURATION: 98 %

## 2024-06-03 DIAGNOSIS — M54.50 RIGHT-SIDED LOW BACK PAIN WITHOUT SCIATICA, UNSPECIFIED CHRONICITY: ICD-10-CM

## 2024-06-03 DIAGNOSIS — I10 BENIGN ESSENTIAL HYPERTENSION: Primary | ICD-10-CM

## 2024-06-03 DIAGNOSIS — E03.9 ACQUIRED HYPOTHYROIDISM: ICD-10-CM

## 2024-06-03 PROCEDURE — G0463 HOSPITAL OUTPT CLINIC VISIT: HCPCS

## 2024-06-03 PROCEDURE — 99214 OFFICE O/P EST MOD 30 MIN: CPT | Performed by: NURSE PRACTITIONER

## 2024-06-03 PROCEDURE — G2211 COMPLEX E/M VISIT ADD ON: HCPCS | Performed by: NURSE PRACTITIONER

## 2024-06-03 RX ORDER — LEVOTHYROXINE SODIUM 50 UG/1
50 TABLET ORAL DAILY
Qty: 90 TABLET | Refills: 3 | Status: SHIPPED | OUTPATIENT
Start: 2024-06-03

## 2024-06-03 RX ORDER — METHOCARBAMOL 500 MG/1
500 TABLET, FILM COATED ORAL 4 TIMES DAILY PRN
Qty: 30 TABLET | Refills: 1 | Status: SHIPPED | OUTPATIENT
Start: 2024-06-03

## 2024-06-03 RX ORDER — LOSARTAN POTASSIUM 50 MG/1
50 TABLET ORAL DAILY
Qty: 90 TABLET | Refills: 1 | Status: SHIPPED | OUTPATIENT
Start: 2024-06-03

## 2024-06-03 ASSESSMENT — PAIN SCALES - GENERAL: PAINLEVEL: MILD PAIN (2)

## 2024-06-03 NOTE — PROGRESS NOTES
Assessment & Plan     Benign essential hypertension  ACE cough  Switch to ARB  She was having good control of her blood pressure with lisinopril, but with side effects will change medication   - losartan (COZAAR) 50 MG tablet; Take 1 tablet (50 mg) by mouth daily    Acquired hypothyroidism  Refilled medication  Reviewed TSH - last check normal   - levothyroxine (SYNTHROID/LEVOTHROID) 50 MCG tablet; Take 1 tablet (50 mcg) by mouth daily    Right-sided low back pain without sciatica, unspecified chronicity  Intermittent low back pain over SI joint with swelling  Discussed self care  Ok to try a muscle relaxant.  She may try at night due to the side effects of drowsiness   - methocarbamol (ROBAXIN) 500 MG tablet; Take 1 tablet (500 mg) by mouth 4 times daily as needed        The longitudinal plan of care for the diagnosis(es)/condition(s) as documented were addressed during this visit. Due to the added complexity in care, I will continue to support Freya in the subsequent management and with ongoing continuity of care.    See Patient Instructions    No follow-ups on file.    Subjective   Freya is a 79 year old, presenting for the following health issues:  Hypertension    HPI     Hypertension Follow-up    Do you check your blood pressure regularly outside of the clinic? No   Are you following a low salt diet? Yes  Are your blood pressures ever more than 140 on the top number (systolic) OR more   than 90 on the bottom number (diastolic), for example 140/90? No  Cough from lisinopril - will switch to an ARB         Review of Systems  CONSTITUTIONAL: NEGATIVE for fever, chills, change in weight  RESP: NEGATIVE for significant cough or SOB  CV: NEGATIVE for chest pain, palpitations or peripheral edema  MUSCULOSKELETAL: right sided lower back pain   NEURO: some numbness into fingers       Objective    /88 (BP Location: Right arm, Patient Position: Sitting, Cuff Size: Adult Regular)   Pulse 79   Temp 98.7  F (37.1  " C) (Tympanic)   Ht 1.651 m (5' 5\")   Wt 64.5 kg (142 lb 3.2 oz)   SpO2 98%   BMI 23.66 kg/m    Body mass index is 23.66 kg/m .  Physical Exam   GENERAL: alert and no distress  RESP: lungs clear to auscultation - no rales, rhonchi or wheezes  CV: regular rate and rhythm, normal S1 S2, no S3 or S4, no murmur, click or rub, no peripheral edema  ABDOMEN: soft, nontender, no hepatosplenomegaly, no masses and bowel sounds normal  MS: tenderness to palpation right lower back   NEURO: Normal strength and tone, sensory exam grossly normal, mentation intact, and guarded movement to stand     Office Visit on 01/02/2024   Component Date Value Ref Range Status    Vitamin D, Total (25-Hydroxy) 01/02/2024 64 (H)  20 - 50 ng/mL Final    indicates supplementation, with increased risk of hypercalciuria    TSH 01/02/2024 2.54  0.30 - 4.20 uIU/mL Final    Sodium 01/02/2024 140  135 - 145 mmol/L Final    Reference intervals for this test were updated on 09/26/2023 to more accurately reflect our healthy population. There may be differences in the flagging of prior results with similar values performed with this method. Interpretation of those prior results can be made in the context of the updated reference intervals.     Potassium 01/02/2024 4.2  3.4 - 5.3 mmol/L Final    Carbon Dioxide (CO2) 01/02/2024 25  22 - 29 mmol/L Final    Anion Gap 01/02/2024 10  7 - 15 mmol/L Final    Urea Nitrogen 01/02/2024 15.0  8.0 - 23.0 mg/dL Final    Creatinine 01/02/2024 0.73  0.51 - 0.95 mg/dL Final    GFR Estimate 01/02/2024 83  >60 mL/min/1.73m2 Final    Calcium 01/02/2024 9.3  8.8 - 10.2 mg/dL Final    Chloride 01/02/2024 105  98 - 107 mmol/L Final    Glucose 01/02/2024 94  70 - 99 mg/dL Final    Alkaline Phosphatase 01/02/2024 93  40 - 150 U/L Final    Reference intervals for this test were updated on 11/14/2023 to more accurately reflect our healthy population. There may be differences in the flagging of prior results with similar values " performed with this method. Interpretation of those prior results can be made in the context of the updated reference intervals.    AST 01/02/2024 27  0 - 45 U/L Final    Reference intervals for this test were updated on 6/12/2023 to more accurately reflect our healthy population. There may be differences in the flagging of prior results with similar values performed with this method. Interpretation of those prior results can be made in the context of the updated reference intervals.    ALT 01/02/2024 19  0 - 50 U/L Final    Reference intervals for this test were updated on 6/12/2023 to more accurately reflect our healthy population. There may be differences in the flagging of prior results with similar values performed with this method. Interpretation of those prior results can be made in the context of the updated reference intervals.      Protein Total 01/02/2024 7.2  6.4 - 8.3 g/dL Final    Albumin 01/02/2024 4.3  3.5 - 5.2 g/dL Final    Bilirubin Total 01/02/2024 1.0  <=1.2 mg/dL Final           Signed Electronically by: STEFAN Shearer CNP

## 2024-09-03 ENCOUNTER — OFFICE VISIT (OUTPATIENT)
Dept: FAMILY MEDICINE | Facility: OTHER | Age: 79
End: 2024-09-03
Attending: NURSE PRACTITIONER
Payer: COMMERCIAL

## 2024-09-03 VITALS
BODY MASS INDEX: 23.21 KG/M2 | HEIGHT: 65 IN | DIASTOLIC BLOOD PRESSURE: 70 MMHG | TEMPERATURE: 97.4 F | SYSTOLIC BLOOD PRESSURE: 132 MMHG | OXYGEN SATURATION: 99 % | WEIGHT: 139.3 LBS | HEART RATE: 70 BPM

## 2024-09-03 DIAGNOSIS — I83.893 VARICOSE VEINS OF BILATERAL LOWER EXTREMITIES WITH OTHER COMPLICATIONS: ICD-10-CM

## 2024-09-03 DIAGNOSIS — I10 BENIGN ESSENTIAL HYPERTENSION: Primary | ICD-10-CM

## 2024-09-03 DIAGNOSIS — F32.0 CURRENT MILD EPISODE OF MAJOR DEPRESSIVE DISORDER WITHOUT PRIOR EPISODE (H): ICD-10-CM

## 2024-09-03 DIAGNOSIS — L30.9 DERMATITIS: ICD-10-CM

## 2024-09-03 PROBLEM — S06.6XAA TRAUMATIC SUBARACHNOID HEMORRHAGE (H): Status: RESOLVED | Noted: 2017-11-16 | Resolved: 2024-09-03

## 2024-09-03 PROCEDURE — G2211 COMPLEX E/M VISIT ADD ON: HCPCS | Performed by: NURSE PRACTITIONER

## 2024-09-03 PROCEDURE — G0463 HOSPITAL OUTPT CLINIC VISIT: HCPCS

## 2024-09-03 PROCEDURE — 99214 OFFICE O/P EST MOD 30 MIN: CPT | Performed by: NURSE PRACTITIONER

## 2024-09-03 RX ORDER — TRIAMCINOLONE ACETONIDE 1 MG/G
CREAM TOPICAL 2 TIMES DAILY
Qty: 80 G | Refills: 1 | Status: SHIPPED | OUTPATIENT
Start: 2024-09-03

## 2024-09-03 ASSESSMENT — PAIN SCALES - GENERAL: PAINLEVEL: NO PAIN (0)

## 2024-09-03 NOTE — PATIENT INSTRUCTIONS
Try compression socks for legs      LIFESTYLE CHANGES  Mediterranean style eating/plant based diet     Increase fiber intake   Protein goal (weight/2.2)  X  0.8-1.2     Eat a Healthy diet  Eat more vegetables/plants in your diet (1/2 your food should be vegetables)  Eat health fats  Lumpkin oil  avocados  Eat healthy proteins  Poultry without the skin  Fish  Limit red meat  Nuts - limit to 1/4 cup per day   Increase physical activity  Slowly work up to 30 minutes of physical activity most days of the week  Aerobic activity 150 minute a week  2 days of resistance exercising   Move every 30-60 minutes         Try daily yoga and meditation and relaxation breathing

## 2024-09-03 NOTE — PROGRESS NOTES
Assessment & Plan     Benign essential hypertension  Stable   Continue with losartan - cough cleared when she stopped lisinopril     Current mild episode of major depressive disorder without prior episode (H24)  Improved     Dermatitis  Rash right lower leg   Ok to use cream as needed   - triamcinolone (KENALOG) 0.1 % external cream; Apply topically 2 times daily.    Varicose veins of bilateral lower extremities with other complications  Try compression socks for varicose veins.  If pain continues consider referral to vascular surgery     The longitudinal plan of care for the diagnosis(es)/condition(s) as documented were addressed during this visit. Due to the added complexity in care, I will continue to support Freya in the subsequent management and with ongoing continuity of care.        See Patient Instructions    No follow-ups on file.    Subjective   Freya is a 79 year old, presenting for the following health issues:  Hypertension, Depression, and Thyroid Problem        9/3/2024    10:35 AM   Additional Questions   Roomed by dominique talley   Accompanied by self         9/3/2024    10:35 AM   Patient Reported Additional Medications   Patient reports taking the following new medications none     History of Present Illness       Mental Health Follow-up:  Patient presents to follow-up on Depression.Patient's depression since last visit has been:  Good  The patient is not having other symptoms associated with depression.      Any significant life events: grief or loss  Patient is not feeling anxious or having panic attacks.  Patient has no concerns about alcohol or drug use.    Hypertension: She presents for follow up of hypertension.  She does not check blood pressure  regularly outside of the clinic. Outpatient blood pressures have not been over 140/90. She does not follow a low salt diet.     Hypothyroidism:     Since last visit, patient describes the following symptoms::  Dry skin, Hair loss and Loose  stools    Reason for visit:  Blood pressure    She eats 2-3 servings of fruits and vegetables daily.She consumes 0 sweetened beverage(s) daily.She exercises with enough effort to increase her heart rate 60 or more minutes per day.  She exercises with enough effort to increase her heart rate 3 or less days per week.   She is taking medications regularly.       Hypertension Follow-up  Switched from ACEi to ARB last visit   Do you check your blood pressure regularly outside of the clinic? No   Are you following a low salt diet? No  Are your blood pressures ever more than 140 on the top number (systolic) OR more   than 90 on the bottom number (diastolic), for example 140/90? No    Depression   How are you doing with your depression since your last visit? Improved   Are you having other symptoms that might be associated with depression? No  Have you had a significant life event?  Grief or Loss   Are you feeling anxious or having panic attacks?   No  Do you have any concerns with your use of alcohol or other drugs? No    Social History     Tobacco Use    Smoking status: Never     Passive exposure: Never    Smokeless tobacco: Never   Vaping Use    Vaping status: Never Used         1/2/2024     8:33 AM   PHQ   PHQ-9 Total Score 0   Q9: Thoughts of better off dead/self-harm past 2 weeks Not at all         1/2/2024     8:34 AM   BETH-7 SCORE   Total Score 0 (minimal anxiety)   Total Score 0         1/2/2024     8:33 AM   Last PHQ-9   1.  Little interest or pleasure in doing things 0   2.  Feeling down, depressed, or hopeless 0   3.  Trouble falling or staying asleep, or sleeping too much 0   4.  Feeling tired or having little energy 0   5.  Poor appetite or overeating 0   6.  Feeling bad about yourself 0   7.  Trouble concentrating 0   8.  Moving slowly or restless 0   Q9: Thoughts of better off dead/self-harm past 2 weeks 0   PHQ-9 Total Score 0         1/2/2024     8:34 AM   BETH-7    1. Feeling nervous, anxious, or on edge 0  "  2. Not being able to stop or control worrying 0   3. Worrying too much about different things 0   4. Trouble relaxing 0   5. Being so restless that it is hard to sit still 0   6. Becoming easily annoyed or irritable 0   7. Feeling afraid, as if something awful might happen 0   BETH-7 Total Score 0   If you checked any problems, how difficult have they made it for you to do your work, take care of things at home, or get along with other people? Not difficult at all       Suicide Assessment Five-step Evaluation and Treatment (SAFE-T)    Hypothyroidism Follow-up    Since last visit, patient describes the following symptoms: dry skin, loose stools, and hair loss      Review of Systems  CONSTITUTIONAL: NEGATIVE for fever, chills, change in weight  INTEGUMENTARY/SKIN: hx eczema - rash on legs again and upper lip   EYES: Hx glaucoma  RESP: NEGATIVE for significant cough or SOB  CV: POSITIVE for varicose veins and NEGATIVE for chest pain/chest pressure and palpitations  GI: Hx IBS  : denies dysuria   MUSCULOSKELETAL: NEGATIVE for significant arthralgias or myalgia  NEURO: dizziness   PSYCHIATRIC: NEGATIVE for changes in mood or affect      Objective    /70 (BP Location: Left arm, Patient Position: Sitting, Cuff Size: Adult Regular)   Pulse 70   Temp 97.4  F (36.3  C) (Tympanic)   Ht 1.651 m (5' 5\")   Wt 63.2 kg (139 lb 4.8 oz)   SpO2 99%   BMI 23.18 kg/m    Body mass index is 23.18 kg/m .  Physical Exam   GENERAL: alert and no distress  RESP: lungs clear to auscultation - no rales, rhonchi or wheezes  CV: regular rate and rhythm, normal S1 S2, no S3 or S4, no murmur, click or rub, no peripheral edema  ABDOMEN: soft, nontender, no hepatosplenomegaly, no masses and bowel sounds normal  MS: no gross musculoskeletal defects noted, no edema  SKIN: itchy rash dry erythema patches right lower leg   PSYCH: mentation appears normal, affect normal/bright    Office Visit on 01/02/2024   Component Date Value Ref Range " Status    Vitamin D, Total (25-Hydroxy) 01/02/2024 64 (H)  20 - 50 ng/mL Final    indicates supplementation, with increased risk of hypercalciuria    TSH 01/02/2024 2.54  0.30 - 4.20 uIU/mL Final    Sodium 01/02/2024 140  135 - 145 mmol/L Final    Reference intervals for this test were updated on 09/26/2023 to more accurately reflect our healthy population. There may be differences in the flagging of prior results with similar values performed with this method. Interpretation of those prior results can be made in the context of the updated reference intervals.     Potassium 01/02/2024 4.2  3.4 - 5.3 mmol/L Final    Carbon Dioxide (CO2) 01/02/2024 25  22 - 29 mmol/L Final    Anion Gap 01/02/2024 10  7 - 15 mmol/L Final    Urea Nitrogen 01/02/2024 15.0  8.0 - 23.0 mg/dL Final    Creatinine 01/02/2024 0.73  0.51 - 0.95 mg/dL Final    GFR Estimate 01/02/2024 83  >60 mL/min/1.73m2 Final    Calcium 01/02/2024 9.3  8.8 - 10.2 mg/dL Final    Chloride 01/02/2024 105  98 - 107 mmol/L Final    Glucose 01/02/2024 94  70 - 99 mg/dL Final    Alkaline Phosphatase 01/02/2024 93  40 - 150 U/L Final    Reference intervals for this test were updated on 11/14/2023 to more accurately reflect our healthy population. There may be differences in the flagging of prior results with similar values performed with this method. Interpretation of those prior results can be made in the context of the updated reference intervals.    AST 01/02/2024 27  0 - 45 U/L Final    Reference intervals for this test were updated on 6/12/2023 to more accurately reflect our healthy population. There may be differences in the flagging of prior results with similar values performed with this method. Interpretation of those prior results can be made in the context of the updated reference intervals.    ALT 01/02/2024 19  0 - 50 U/L Final    Reference intervals for this test were updated on 6/12/2023 to more accurately reflect our healthy population. There may be  differences in the flagging of prior results with similar values performed with this method. Interpretation of those prior results can be made in the context of the updated reference intervals.      Protein Total 01/02/2024 7.2  6.4 - 8.3 g/dL Final    Albumin 01/02/2024 4.3  3.5 - 5.2 g/dL Final    Bilirubin Total 01/02/2024 1.0  <=1.2 mg/dL Final           Signed Electronically by: STEFAN Shearer CNP

## 2024-10-29 ENCOUNTER — HOSPITAL ENCOUNTER (EMERGENCY)
Facility: HOSPITAL | Age: 79
Discharge: HOME | End: 2024-10-29
Attending: NURSE PRACTITIONER | Admitting: NURSE PRACTITIONER
Payer: MEDICARE

## 2024-10-29 VITALS
RESPIRATION RATE: 19 BRPM | OXYGEN SATURATION: 96 % | DIASTOLIC BLOOD PRESSURE: 65 MMHG | TEMPERATURE: 100.2 F | HEART RATE: 75 BPM | SYSTOLIC BLOOD PRESSURE: 145 MMHG

## 2024-10-29 DIAGNOSIS — M54.2 NECK PAIN ON LEFT SIDE: Primary | ICD-10-CM

## 2024-10-29 DIAGNOSIS — R59.0 CERVICAL LYMPHADENOPATHY: ICD-10-CM

## 2024-10-29 LAB
ANION GAP SERPL CALCULATED.3IONS-SCNC: 12 MMOL/L (ref 7–15)
BASOPHILS # BLD AUTO: 0 10E3/UL (ref 0–0.2)
BASOPHILS NFR BLD AUTO: 0 %
BUN SERPL-MCNC: 9.4 MG/DL (ref 8–23)
CALCIUM SERPL-MCNC: 9.3 MG/DL (ref 8.8–10.4)
CHLORIDE SERPL-SCNC: 102 MMOL/L (ref 98–107)
CREAT SERPL-MCNC: 0.9 MG/DL (ref 0.51–0.95)
CRP SERPL-MCNC: 38.64 MG/L
EGFRCR SERPLBLD CKD-EPI 2021: 64 ML/MIN/1.73M2
EOSINOPHIL # BLD AUTO: 0 10E3/UL (ref 0–0.7)
EOSINOPHIL NFR BLD AUTO: 0 %
ERYTHROCYTE [DISTWIDTH] IN BLOOD BY AUTOMATED COUNT: 13 % (ref 10–15)
FLUAV RNA SPEC QL NAA+PROBE: NEGATIVE
FLUBV RNA RESP QL NAA+PROBE: NEGATIVE
GLUCOSE SERPL-MCNC: 112 MG/DL (ref 70–99)
GROUP A STREP BY PCR: NOT DETECTED
HCO3 SERPL-SCNC: 26 MMOL/L (ref 22–29)
HCT VFR BLD AUTO: 36.5 % (ref 35–47)
HGB BLD-MCNC: 12.1 G/DL (ref 11.7–15.7)
HOLD SPECIMEN: NORMAL
IMM GRANULOCYTES # BLD: 0.1 10E3/UL
IMM GRANULOCYTES NFR BLD: 1 %
LYMPHOCYTES # BLD AUTO: 0.9 10E3/UL (ref 0.8–5.3)
LYMPHOCYTES NFR BLD AUTO: 9 %
MCH RBC QN AUTO: 31.3 PG (ref 26.5–33)
MCHC RBC AUTO-ENTMCNC: 33.2 G/DL (ref 31.5–36.5)
MCV RBC AUTO: 94 FL (ref 78–100)
MONOCYTES # BLD AUTO: 0.6 10E3/UL (ref 0–1.3)
MONOCYTES NFR BLD AUTO: 6 %
MONOCYTES NFR BLD AUTO: NEGATIVE %
NEUTROPHILS # BLD AUTO: 8.4 10E3/UL (ref 1.6–8.3)
NEUTROPHILS NFR BLD AUTO: 84 %
NRBC # BLD AUTO: 0 10E3/UL
NRBC BLD AUTO-RTO: 0 /100
PLATELET # BLD AUTO: 248 10E3/UL (ref 150–450)
POTASSIUM SERPL-SCNC: 4 MMOL/L (ref 3.4–5.3)
RBC # BLD AUTO: 3.87 10E6/UL (ref 3.8–5.2)
RSV RNA SPEC NAA+PROBE: NEGATIVE
SARS-COV-2 RNA RESP QL NAA+PROBE: NEGATIVE
SODIUM SERPL-SCNC: 140 MMOL/L (ref 135–145)
TROPONIN T SERPL HS-MCNC: 8 NG/L
WBC # BLD AUTO: 10 10E3/UL (ref 4–11)

## 2024-10-29 PROCEDURE — 93010 ELECTROCARDIOGRAM REPORT: CPT | Performed by: INTERNAL MEDICINE

## 2024-10-29 PROCEDURE — 250N000013 HC RX MED GY IP 250 OP 250 PS 637: Performed by: NURSE PRACTITIONER

## 2024-10-29 PROCEDURE — 87651 STREP A DNA AMP PROBE: CPT | Performed by: NURSE PRACTITIONER

## 2024-10-29 PROCEDURE — 99214 OFFICE O/P EST MOD 30 MIN: CPT | Performed by: NURSE PRACTITIONER

## 2024-10-29 PROCEDURE — 93005 ELECTROCARDIOGRAM TRACING: CPT

## 2024-10-29 PROCEDURE — 82947 ASSAY GLUCOSE BLOOD QUANT: CPT | Performed by: NURSE PRACTITIONER

## 2024-10-29 PROCEDURE — 85004 AUTOMATED DIFF WBC COUNT: CPT | Performed by: NURSE PRACTITIONER

## 2024-10-29 PROCEDURE — 86140 C-REACTIVE PROTEIN: CPT | Performed by: NURSE PRACTITIONER

## 2024-10-29 PROCEDURE — 87637 SARSCOV2&INF A&B&RSV AMP PRB: CPT | Performed by: NURSE PRACTITIONER

## 2024-10-29 PROCEDURE — 84484 ASSAY OF TROPONIN QUANT: CPT | Performed by: NURSE PRACTITIONER

## 2024-10-29 PROCEDURE — 86308 HETEROPHILE ANTIBODY SCREEN: CPT | Performed by: NURSE PRACTITIONER

## 2024-10-29 PROCEDURE — 36415 COLL VENOUS BLD VENIPUNCTURE: CPT | Performed by: NURSE PRACTITIONER

## 2024-10-29 PROCEDURE — G0463 HOSPITAL OUTPT CLINIC VISIT: HCPCS | Mod: 25

## 2024-10-29 PROCEDURE — 80048 BASIC METABOLIC PNL TOTAL CA: CPT | Performed by: NURSE PRACTITIONER

## 2024-10-29 RX ORDER — LIDOCAINE 4 G/G
1 PATCH TOPICAL ONCE
Status: DISCONTINUED | OUTPATIENT
Start: 2024-10-29 | End: 2024-10-29 | Stop reason: HOSPADM

## 2024-10-29 RX ORDER — KETOROLAC TROMETHAMINE 15 MG/ML
15 INJECTION, SOLUTION INTRAMUSCULAR; INTRAVENOUS ONCE
Status: DISCONTINUED | OUTPATIENT
Start: 2024-10-29 | End: 2024-10-29

## 2024-10-29 RX ORDER — PREDNISONE 20 MG/1
TABLET ORAL
Qty: 10 TABLET | Refills: 0 | Status: SHIPPED | OUTPATIENT
Start: 2024-10-29

## 2024-10-29 RX ORDER — ACETAMINOPHEN 325 MG/1
975 TABLET ORAL ONCE
Status: COMPLETED | OUTPATIENT
Start: 2024-10-29 | End: 2024-10-29

## 2024-10-29 RX ADMIN — ACETAMINOPHEN 975 MG: 325 TABLET, FILM COATED ORAL at 13:01

## 2024-10-29 RX ADMIN — LIDOCAINE 1 PATCH: 4 PATCH TOPICAL at 13:01

## 2024-10-29 ASSESSMENT — ENCOUNTER SYMPTOMS
APPETITE CHANGE: 0
TROUBLE SWALLOWING: 0
COUGH: 0
CHILLS: 0
SORE THROAT: 1
VOICE CHANGE: 0
PALPITATIONS: 0
FEVER: 1
DIZZINESS: 0
NECK STIFFNESS: 1
NECK PAIN: 1
RHINORRHEA: 1
SHORTNESS OF BREATH: 0

## 2024-10-29 ASSESSMENT — COLUMBIA-SUICIDE SEVERITY RATING SCALE - C-SSRS
1. IN THE PAST MONTH, HAVE YOU WISHED YOU WERE DEAD OR WISHED YOU COULD GO TO SLEEP AND NOT WAKE UP?: NO
2. HAVE YOU ACTUALLY HAD ANY THOUGHTS OF KILLING YOURSELF IN THE PAST MONTH?: NO
6. HAVE YOU EVER DONE ANYTHING, STARTED TO DO ANYTHING, OR PREPARED TO DO ANYTHING TO END YOUR LIFE?: NO

## 2024-10-29 ASSESSMENT — ACTIVITIES OF DAILY LIVING (ADL)
ADLS_ACUITY_SCORE: 0
ADLS_ACUITY_SCORE: 0

## 2024-10-29 NOTE — ED PROVIDER NOTES
History     Chief Complaint   Patient presents with    Pharyngitis    Neck Pain     HPI  Freya Lee is a 79 year old female who presents ambulatory to urgent care for evaluation of neck pain and swollen lymph nodes.  Patient tells me that about 2 days ago she woke up with a right-sided swollen neck lymph node that gradually resolved as the day progressed.  The next day she started feeling like she might be getting a sore throat.  This was accompanied by left sided neck pain.  Has a history of stiff neck and notes that she took her muscle relaxants throughout the day with no change in the pain.  She describes the pain as sharp.  It initially was in her left upper back and now its moved up into her neck.  Denies any recent trauma or injury.  No significant trouble swallowing.  No known fevers at home.  She always has rhinorrhea which has not changed.  No nasal congestion.  No cough chest pain or shortness of breath.  This morning she woke up with right swollen lymph node again.  This prompted today's visit.  She also states her head feels foggy.    Allergies:  Allergies   Allergen Reactions    Lisinopril Cough    Penicillins     Sulfa Antibiotics     Vancomycin Other (See Comments)     Cash syndrome.       Problem List:    Patient Active Problem List    Diagnosis Date Noted    Current mild episode of major depressive disorder without prior episode (H) 09/03/2024     Priority: Medium    Primary open angle glaucoma of left eye 08/17/2023     Priority: Medium    Posterior vitreous detachment 06/03/2023     Priority: Medium    Biliary colic 02/24/2021     Priority: Medium     Formatting of this note might be different from the original.   Added automatically from request for surgery [id number]      Acute pain of left shoulder 04/23/2018     Priority: Medium    Muscle weakness 04/23/2018     Priority: Medium    Neck pain 04/23/2018     Priority: Medium    Post concussion syndrome 02/08/2018     Priority: Medium     Concussion 11/16/2017     Priority: Medium    Scalp laceration 11/16/2017     Priority: Medium    Abnormal CBC 04/25/2017     Priority: Medium    Fracture of 5th metatarsal 04/29/2015     Priority: Medium    Nondisplaced fracture of tibial tuberosity 01/07/2013     Priority: Medium    Health care maintenance 12/03/2012     Priority: Medium     Colonoscopy in 11/09, normal , next one in 10 yrs, Dr. Bustamante      Depression with anxiety 11/08/2012     Priority: Medium    Dermatitis 05/17/2012     Priority: Medium    Allergic rhinitis 01/05/2012     Priority: Medium     No meds      Hypothyroidism 01/05/2012     Priority: Medium     IMO Update 10/11  (see OV encounter dated 11/16/12 ... MARCELINO MINOR RN, 3/16/2013 6:36 AM )      Vitamin D deficiency 01/05/2012     Priority: Medium    Rosacea 06/15/2011     Priority: Medium    Other abnormal glucose 11/25/2008     Priority: Medium    Anemia, unspecified 07/22/2008     Priority: Medium     Post op after hip replacement.      Unspecified glaucoma 04/08/2008     Priority: Medium    Aftercare for healing traumatic fracture of lower arm 01/28/2008     Priority: Medium        Past Medical History:    History reviewed. No pertinent past medical history.    Past Surgical History:    History reviewed. No pertinent surgical history.    Family History:    History reviewed. No pertinent family history.    Social History:  Marital Status:  Single [1]  Social History     Tobacco Use    Smoking status: Never     Passive exposure: Never    Smokeless tobacco: Never   Vaping Use    Vaping status: Never Used        Medications:    levothyroxine (SYNTHROID/LEVOTHROID) 50 MCG tablet  losartan (COZAAR) 50 MG tablet  methocarbamol (ROBAXIN) 500 MG tablet  Multiple Vitamins-Minerals (EYE VITAMINS PO)  predniSONE (DELTASONE) 20 MG tablet  cholecalciferol (VITAMIN D3) 125 mcg (5000 units) capsule  dorzolamide-timolol (COSOPT) 2-0.5 % ophthalmic solution  latanoprost (XALATAN) 0.005 % ophthalmic  solution  propranolol (INDERAL) 20 MG tablet  triamcinolone (KENALOG) 0.1 % external cream  Turmeric, Curcuma Longa, (CURCUMIN EXTRACT) POWD          Review of Systems   Constitutional:  Positive for fever. Negative for appetite change and chills.   HENT:  Positive for rhinorrhea (Chronic) and sore throat. Negative for trouble swallowing and voice change.    Respiratory:  Negative for cough and shortness of breath.    Cardiovascular:  Negative for chest pain, palpitations and leg swelling.   Musculoskeletal:  Positive for neck pain and neck stiffness (Chronic).   Neurological:  Negative for dizziness.   All other systems reviewed and are negative.      Physical Exam   BP: (!) 184/74  Pulse: 81  Temp: 100.4  F (38  C)  Resp: 19  SpO2: 97 %      Physical Exam  Vitals and nursing note reviewed.   Constitutional:       Appearance: She is well-developed. She is not ill-appearing or toxic-appearing.   HENT:      Head: Atraumatic.      Right Ear: Tympanic membrane and ear canal normal. Tympanic membrane is not erythematous.      Left Ear: Tympanic membrane and ear canal normal. Tympanic membrane is not erythematous.      Nose: Rhinorrhea present. No congestion.      Mouth/Throat:      Mouth: Mucous membranes are moist.      Pharynx: Uvula midline. No pharyngeal swelling or oropharyngeal exudate.      Tonsils: No tonsillar abscesses. 0 on the right. 0 on the left.   Eyes:      Pupils: Pupils are equal, round, and reactive to light.   Neck:      Trachea: Trachea normal.      Meningeal: Brudzinski's sign and Kernig's sign absent.        Comments: Right-sided lateral rotation appears to be decreased which patient notes is chronic for her.  Left-sided lateral rotation is decreased although patient states it feels much better now than it did earlier this morning when she woke up.  She has pain with flexion and hyperextension of her neck.  Negative Brudzinski's and Kernig's.  No swelling, erythema or rash to this  area.  Cardiovascular:      Rate and Rhythm: Normal rate and regular rhythm.      Heart sounds: Normal heart sounds. No murmur heard.     No friction rub.   Pulmonary:      Effort: Pulmonary effort is normal. No respiratory distress.      Breath sounds: Normal breath sounds. No wheezing.   Musculoskeletal:      Cervical back: No erythema, signs of trauma or crepitus. Pain with movement present. Decreased range of motion.   Lymphadenopathy:      Cervical: Cervical adenopathy present.      Left cervical: Superficial cervical adenopathy present.   Skin:     General: Skin is warm and dry.      Coloration: Skin is not pale.      Findings: No erythema or rash.   Neurological:      Mental Status: She is alert and oriented to person, place, and time.         ED Course     ED Course as of 10/29/24 1356   Tue Oct 29, 2024   1220 Differentials include but not limited to: ACS, Meningitis, Cervical radiculopathy, strep throat; mono, cervical lymphadenopathy   1259 Strep Group A PCR: Not Detected   1311 Symptomatic Influenza A/B, RSV, & SARS-CoV2 PCR (COVID-19) Nose  Negative for COVID-19, influenza and RSV.    1346 Troponin T, High Sensitivity: 8  Normal for gender. Symptoms started >6hrs ago.      Procedures              EKG Interpretation:      Interpreted by Natty Robin CNP  Time reviewed: 1253  Symptoms at time of EKG: left neck pain   Rhythm: normal sinus   Rate: Normal  Axis: Left Axis Deviation  Ectopy: none  Conduction: right bundle branch block (incomplete)  ST Segments/ T Waves: No ST-T wave changes  Q Waves: none  Comparison to prior: No old EKG available    Clinical Impression: normal EKG               Results for orders placed or performed during the hospital encounter of 10/29/24 (from the past 24 hours)   Symptomatic Influenza A/B, RSV, & SARS-CoV2 PCR (COVID-19) Nose    Specimen: Nose; Swab   Result Value Ref Range    Influenza A PCR Negative Negative    Influenza B PCR Negative Negative    RSV PCR Negative  Negative    SARS CoV2 PCR Negative Negative    Narrative    Testing was performed using the Xpert Xpress CoV2/Flu/RSV Assay on the [employer] Instrument. This test should be ordered for the detection of SARS-CoV2, influenza, and RSV viruses in individuals with signs and symptoms of respiratory tract infection. This test is for in vitro diagnostic use under the [place] FDA for laboratories certified under CLIA to perform high or moderate complexity testing. This test has been [place] FDA cleared. A negative result does not rule out the presence of PCR inhibitors in the specimen or target RNA in concentration below the limit of detection for the assay. If only one viral target is positive but coinfection with multiple targets is suspected, the sample should be re-tested with another FDA cleared, approved, or authorized test, if coninfection would change clinical management. This test was validated by the [facility] [employer]. These laboratories are certified under the Clinical Laboratory Improvement Amendments of 1988 (CLIA-88) as qualified to perfom high complexity laboratory testing.   Group A Streptococcus PCR Throat Swab    Specimen: Throat; Swab   Result Value Ref Range    Group A strep by PCR Not Detected Not Detected    Narrative    The Xpert Xpress Strep A test, performed on the [employer]  Instrument Systems, is a rapid, qualitative in vitro diagnostic test for the detection of Streptococcus pyogenes (Group A ß-hemolytic Streptococcus, Strep A) in throat swab specimens from patients with signs and symptoms of pharyngitis. The Xpert Xpress Strep A test can be used as an aid in the diagnosis of Group A Streptococcal pharyngitis. The assay is not intended to monitor treatment for Group A Streptococcus infections. The Xpert Xpress Strep A test utilizes an automated real-time polymerase chain reaction (PCR) to detect Streptococcus pyogenes DNA.   CBC with platelets differential    Narrative    The following  orders were created for panel order CBC with platelets differential.  Procedure                               Abnormality         Status                     ---------                               -----------         ------                     CBC with platelets and d...[[id number]]  Abnormal            Final result                 Please view results for these tests on the individual orders.   Basic metabolic panel   Result Value Ref Range    Sodium 140 135 - 145 mmol/L    Potassium 4.0 3.4 - 5.3 mmol/L    Chloride 102 98 - 107 mmol/L    Carbon Dioxide (CO2) 26 22 - 29 mmol/L    Anion Gap 12 7 - 15 mmol/L    Urea Nitrogen 9.4 8.0 - 23.0 mg/dL    Creatinine 0.90 0.51 - 0.95 mg/dL    GFR Estimate 64 >60 mL/min/1.73m2    Calcium 9.3 8.8 - 10.4 mg/dL    Glucose 112 (H) 70 - 99 mg/dL   CRP inflammation   Result Value Ref Range    CRP Inflammation 38.64 (H) <5.00 mg/L   Troponin T, High Sensitivity   Result Value Ref Range    Troponin T, High Sensitivity 8 <=14 ng/L   CBC with platelets and differential   Result Value Ref Range    WBC Count 10.0 4.0 - 11.0 10e3/uL    RBC Count 3.87 3.80 - 5.20 10e6/uL    Hemoglobin 12.1 11.7 - 15.7 g/dL    Hematocrit 36.5 35.0 - 47.0 %    MCV 94 78 - 100 fL    MCH 31.3 26.5 - 33.0 pg    MCHC 33.2 31.5 - 36.5 g/dL    RDW 13.0 10.0 - 15.0 %    Platelet Count 248 150 - 450 10e3/uL    % Neutrophils 84 %    % Lymphocytes 9 %    % Monocytes 6 %    % Eosinophils 0 %    % Basophils 0 %    % Immature Granulocytes 1 %    NRBCs per 100 WBC 0 <1 /100    Absolute Neutrophils 8.4 (H) 1.6 - 8.3 10e3/uL    Absolute Lymphocytes 0.9 0.8 - 5.3 10e3/uL    Absolute Monocytes 0.6 0.0 - 1.3 10e3/uL    Absolute Eosinophils 0.0 0.0 - 0.7 10e3/uL    Absolute Basophils 0.0 0.0 - 0.2 10e3/uL    Absolute Immature Granulocytes 0.1 <=0.4 10e3/uL    Absolute NRBCs 0.0 10e3/uL   Extra Tube    Narrative    The following orders were created for panel order Extra Tube.  Procedure                               Abnormality          Status                     ---------                               -----------         ------                     Extra Blue Top Tube[[id number]]                              In process                 Extra Red Top Tube[[id number]]                               In process                 Extra Heparinized Syringe[[id number]]                        In process                   Please view results for these tests on the individual orders.   Mononucleosis screen   Result Value Ref Range    Mononucleosis Screen Negative Negative       Medications   Lidocaine (LIDOCARE) 4 % Patch 1 patch (1 patch Transdermal $Patch/Med Applied 10/29/24 1301)   acetaminophen (TYLENOL) tablet 975 mg (975 mg Oral $Given 10/29/24 1301)       Assessments & Plan (with Medical Decision Making)  Pleasant 79-year-old female that presented for evaluation of left-sided neck pain with no known injury.  Symptoms initially started with swollen lymph nodes on the right side of her neck and then she developed upper left shoulder pain which progressed to the left side of her neck and prompted this visit.  Noted that she felt like she was starting to get a sore throat yesterday but does not appear to be complaining much of a sore throat during this visit.  She was noted to have a low-grade fever upon arrival to this facility.  No focal tenderness on palpation of her neck.  Pain reproducible with movement and she was noted to have some decreased range of motion.  She does have some chronic decreased lateral rotation.  Negative Brudzinski's and Kernig's.  Lab work today was reassuring with without leukocytosis.  No electrolyte abnormalities.  Normal kidney function.  She does have elevated CRP.  Right-sided cervical lymphadenopathy with no significant tenderness on palpation of the lymph node.  No erythema or bruising or any other skin color changes appreciated to the area.  Her EKG showed normal sinus rhythm with no Q waves, ST elevation or depression.   Her strep test and respiratory viral panel results all came back negative.  She was treated with a lidocaine patch as well as Tylenol and reported significant improvement in her symptoms.  All findings were discussed with patient.  Likely musculoskeletal and viral illness.  Advised her to continue with her muscle relaxant.  Will prescribe prednisone.  May take Tylenol or ibuprofen as needed for pain.  Apply warm compresses over the swollen lymph node.  Close follow-up with primary doctor was recommended.  Strict return precautions discussed with patient who verbalized understanding and was in agreement with plan of care.     I have reviewed the nursing notes.    I have reviewed the findings, diagnosis, plan and need for follow up with the patient.  This document was prepared using a combination of typing and voice generated software.  While every attempt was made for accuracy, spelling and grammatical errors may exist.         New Prescriptions    PREDNISONE (DELTASONE) 20 MG TABLET    Take two tablets (= 40mg) each day for 5 (five) days       Final diagnoses:   Neck pain on left side   Cervical lymphadenopathy       10/29/2024   [facility]       Mpofu, Prudence, CNP  10/29/24 2094

## 2024-10-29 NOTE — DISCHARGE INSTRUCTIONS
As discussed on your blood work and EKG look good.  You do have some inflammation somewhere in your body is noted on your blood work.    Apply warm compresses over your swollen lymph node, continue taking the muscle relaxant and start taking the prednisone as prescribed.  Tylenol or ibuprofen as needed for pain.    Schedule follow-up appointment with your primary doctor for reevaluation in 3 to 5 days.    Return to urgent care or emergency room if any worsening or concerning symptoms.

## 2024-10-29 NOTE — ED TRIAGE NOTES
C/o sore throat    Sx started 2 days ago    Woke up with swollen gland on the right side.   States size has decreased , along with neck pain on the left side    Took a muscle relaxer

## 2024-10-30 LAB
ATRIAL RATE - MUSE: 86 BPM
DIASTOLIC BLOOD PRESSURE - MUSE: NORMAL MMHG
INTERPRETATION ECG - MUSE: NORMAL
P AXIS - MUSE: 74 DEGREES
PR INTERVAL - MUSE: 142 MS
QRS DURATION - MUSE: 94 MS
QT - MUSE: 386 MS
QTC - MUSE: 461 MS
R AXIS - MUSE: -39 DEGREES
SYSTOLIC BLOOD PRESSURE - MUSE: NORMAL MMHG
T AXIS - MUSE: 48 DEGREES
VENTRICULAR RATE- MUSE: 86 BPM

## 2024-11-04 DIAGNOSIS — I10 BENIGN ESSENTIAL HYPERTENSION: ICD-10-CM

## 2024-11-04 RX ORDER — LOSARTAN POTASSIUM 50 MG/1
50 TABLET ORAL DAILY
Qty: 90 TABLET | Refills: 3 | Status: SHIPPED | OUTPATIENT
Start: 2024-11-04

## 2024-11-04 NOTE — TELEPHONE ENCOUNTER
losartan (COZAAR) 50 MG tablet         Last Written Prescription Date:  6/3/24  Last Fill Quantity: 90,   # refills: 1  Last Office Visit: 9/3/24  Future Office visit:       Routing refill request to provider for review/approval because:  Angiotensin-II Receptors Failed      Most recent blood pressure under 140/90 in past 12 months        BP Readings from Last 3 Encounters:   10/29/24 145/65   09/03/24 132/70   06/03/24 130/88

## 2024-12-03 ENCOUNTER — TELEPHONE (OUTPATIENT)
Dept: FAMILY MEDICINE | Facility: OTHER | Age: 79
End: 2024-12-03

## 2024-12-03 ENCOUNTER — OFFICE VISIT (OUTPATIENT)
Dept: FAMILY MEDICINE | Facility: OTHER | Age: 79
End: 2024-12-03
Attending: STUDENT IN AN ORGANIZED HEALTH CARE EDUCATION/TRAINING PROGRAM
Payer: MEDICARE

## 2024-12-03 VITALS
BODY MASS INDEX: 24.01 KG/M2 | TEMPERATURE: 98 F | DIASTOLIC BLOOD PRESSURE: 76 MMHG | WEIGHT: 144.1 LBS | OXYGEN SATURATION: 97 % | HEIGHT: 65 IN | SYSTOLIC BLOOD PRESSURE: 150 MMHG | HEART RATE: 74 BPM

## 2024-12-03 DIAGNOSIS — R59.1 LYMPHADENOPATHY: Primary | ICD-10-CM

## 2024-12-03 LAB
BASOPHILS # BLD AUTO: 0 10E3/UL (ref 0–0.2)
BASOPHILS NFR BLD AUTO: 1 %
CRP SERPL-MCNC: 3.5 MG/L
EOSINOPHIL # BLD AUTO: 0.1 10E3/UL (ref 0–0.7)
EOSINOPHIL NFR BLD AUTO: 1 %
ERYTHROCYTE [DISTWIDTH] IN BLOOD BY AUTOMATED COUNT: 12.7 % (ref 10–15)
ERYTHROCYTE [SEDIMENTATION RATE] IN BLOOD BY WESTERGREN METHOD: 14 MM/HR (ref 0–30)
HCT VFR BLD AUTO: 37.9 % (ref 35–47)
HGB BLD-MCNC: 12.7 G/DL (ref 11.7–15.7)
IMM GRANULOCYTES # BLD: 0 10E3/UL
IMM GRANULOCYTES NFR BLD: 1 %
LYMPHOCYTES # BLD AUTO: 1.6 10E3/UL (ref 0.8–5.3)
LYMPHOCYTES NFR BLD AUTO: 24 %
MCH RBC QN AUTO: 31 PG (ref 26.5–33)
MCHC RBC AUTO-ENTMCNC: 33.5 G/DL (ref 31.5–36.5)
MCV RBC AUTO: 92 FL (ref 78–100)
MONOCYTES # BLD AUTO: 0.5 10E3/UL (ref 0–1.3)
MONOCYTES NFR BLD AUTO: 8 %
NEUTROPHILS # BLD AUTO: 4.2 10E3/UL (ref 1.6–8.3)
NEUTROPHILS NFR BLD AUTO: 65 %
NRBC # BLD AUTO: 0 10E3/UL
NRBC BLD AUTO-RTO: 0 /100
PLATELET # BLD AUTO: 270 10E3/UL (ref 150–450)
RBC # BLD AUTO: 4.1 10E6/UL (ref 3.8–5.2)
WBC # BLD AUTO: 6.4 10E3/UL (ref 4–11)

## 2024-12-03 PROCEDURE — 86618 LYME DISEASE ANTIBODY: CPT | Mod: ZL | Performed by: STUDENT IN AN ORGANIZED HEALTH CARE EDUCATION/TRAINING PROGRAM

## 2024-12-03 PROCEDURE — 85048 AUTOMATED LEUKOCYTE COUNT: CPT | Mod: ZL | Performed by: STUDENT IN AN ORGANIZED HEALTH CARE EDUCATION/TRAINING PROGRAM

## 2024-12-03 PROCEDURE — G0463 HOSPITAL OUTPT CLINIC VISIT: HCPCS

## 2024-12-03 PROCEDURE — 86140 C-REACTIVE PROTEIN: CPT | Mod: ZL | Performed by: STUDENT IN AN ORGANIZED HEALTH CARE EDUCATION/TRAINING PROGRAM

## 2024-12-03 PROCEDURE — 85004 AUTOMATED DIFF WBC COUNT: CPT | Mod: ZL | Performed by: STUDENT IN AN ORGANIZED HEALTH CARE EDUCATION/TRAINING PROGRAM

## 2024-12-03 PROCEDURE — 36415 COLL VENOUS BLD VENIPUNCTURE: CPT | Mod: ZL | Performed by: STUDENT IN AN ORGANIZED HEALTH CARE EDUCATION/TRAINING PROGRAM

## 2024-12-03 PROCEDURE — 85652 RBC SED RATE AUTOMATED: CPT | Mod: ZL | Performed by: STUDENT IN AN ORGANIZED HEALTH CARE EDUCATION/TRAINING PROGRAM

## 2024-12-03 ASSESSMENT — PATIENT HEALTH QUESTIONNAIRE - PHQ9
10. IF YOU CHECKED OFF ANY PROBLEMS, HOW DIFFICULT HAVE THESE PROBLEMS MADE IT FOR YOU TO DO YOUR WORK, TAKE CARE OF THINGS AT HOME, OR GET ALONG WITH OTHER PEOPLE: NOT DIFFICULT AT ALL
SUM OF ALL RESPONSES TO PHQ QUESTIONS 1-9: 0
SUM OF ALL RESPONSES TO PHQ QUESTIONS 1-9: 0

## 2024-12-03 ASSESSMENT — PAIN SCALES - GENERAL: PAINLEVEL_OUTOF10: SEVERE PAIN (7)

## 2024-12-03 NOTE — PROGRESS NOTES
Assessment & Plan     Lymphadenopathy  Enlarged tender R sided submental lymph node- per patient, not larger than when patient presented to the ED initially for this on 10/29- diagnosed with viral illness at that time.   No overlying skin changes, firm, mobile and well circumscribed.    Has been seen in the ER for this, lymph node not tender at that time.  She was given a 5 day course of prednisone. She reports that this did improve her symptoms.  The lymph node did decrease in size but then started to increase in size again a few days ago.  It is now significantly tender to palpation  If lymphadenopathy caused by infection, would expect to get better on it's own within 1-2 weeks  No exposure to cats, undercooked meats, no known tick bites (but will check serology as high risk area and some ticks are missed), no history of TB, no recent blood transfusions or transplants, not sexually active, no IVDU, no occupational exposures and no travel anywhere in the last year (domestic or international).    Has been to the dentist on November 12th and everything was reported as normal- no dental infections appreciated.    Will check blood work, pending this, will likely proceed to CT scan of neck to better characterize this  - CBC with platelets and differential; Future  - ESR: Erythrocyte sedimentation rate; Future  - CRP, inflammation; Future  - LYME DISEASE TOTAL ANTIBODIES WITH REFLEX TO CONFIRMATION; Future  - CBC with platelets and differential  - ESR: Erythrocyte sedimentation rate  - CRP, inflammation  - LYME DISEASE TOTAL ANTIBODIES WITH REFLEX TO CONFIRMATION  - CT Soft Tissue Neck w Contrast; Future          No follow-ups on file.    Saul Pillai is a 79 year old, presenting for the following health issues:  Swelling (R side of jaw)        12/3/2024    12:59 PM   Additional Questions   Roomed by Chayo hernandez   Accompanied by self         12/3/2024    12:59 PM   Patient Reported Additional Medications  "  Patient reports taking the following new medications None     HPI     End of October went to ER for his   Right eye started to get funny, blurry.    Has history of glaucoma and right eye is her weaker eye or her 'bad eye' as she calls it  Hair keeps falling out consistently  On thyroid replacement and thought that this would be better   Does wake up sweaty off and on- this is new.    Last few times  Hurts when she turns a certain way or when she sleeps   Tender to touch.    Was put on prednisone for 5 days, and then it recurred 3 days ago.    No headaches,   Had dental check up recently about 3 weeks ago- November 12 and everything looked normal.  Gums were fine.    Nose is always running, always has allergies.    Felt fatigued a while ago and took covid testing and negative.        Concern - Swelling  Onset: 3 days  Description: swelling around right side of jaw  Intensity: mild  Progression of Symptoms:  same  Accompanying Signs & Symptoms: None  Previous history of similar problem: Went to  in October   Precipitating factors:        Worsened by: None  Alleviating factors:        Improved by: None  Therapies tried and outcome: None        Review of Systems  Constitutional, HEENT, cardiovascular, pulmonary, GI, , musculoskeletal, neuro, skin, endocrine and psych systems are negative, except as otherwise noted.      Objective    BP (!) 166/78   Pulse 74   Temp 98  F (36.7  C) (Tympanic)   Ht 1.651 m (5' 5\")   Wt 65.4 kg (144 lb 1.6 oz)   SpO2 97%   BMI 23.98 kg/m    Body mass index is 23.98 kg/m .  Physical Exam   GENERAL: alert and no distress  EYES: Eyes grossly normal to inspection, PERRL and conjunctivae and sclerae normal  HENT: ear canals and TM's normal, nose and mouth without ulcers or lesions  NECK:,no asymmetry, masses, or scars, Enlarged tender R sided submental lymph node. No overlying skin changes, mobile on exam and well circumscribed.  RESP: lungs clear to auscultation - no rales, rhonchi " or wheezes  CV: regular rate and rhythm, normal S1 S2, no S3 or S4, no murmur, click or rub, no peripheral edema  ABDOMEN: soft, nontender, no hepatosplenomegaly, no masses and bowel sounds normal  MS: no gross musculoskeletal defects noted, no edema  SKIN: no suspicious lesions or rashes  NEURO: Normal strength and tone, mentation intact and speech normal  PSYCH: mentation appears normal, affect normal/bright        Signed Electronically by: Preeti Trujillo MD

## 2024-12-03 NOTE — TELEPHONE ENCOUNTER
12:01 PM    Reason for Call: OVERBOOK    Patient is having the following symptoms: swollen lymph node Right side of jaw for 3 days.    The patient is requesting an appointment for today or tomorrow  with a Female PCP Dr Nasir Pittman.    Was an appointment offered for this call? Yes  If yes : Appointment type but wants a female provider              Date    Preferred method for responding to this message: Telephone Call  What is your phone number ? [phone]    If we cannot reach you directly, may we leave a detailed response at the number you provided? Yes    Can this message wait until your PCP/provider returns, if unavailable today? Jade Sepulveda

## 2024-12-03 NOTE — TELEPHONE ENCOUNTER
Writer called and spoke with patient. Patient has been scheduled to see Dr. Trujillo on 12/3/2024 at 100.

## 2024-12-04 ENCOUNTER — HOSPITAL ENCOUNTER (OUTPATIENT)
Dept: CT IMAGING | Facility: HOSPITAL | Age: 79
Discharge: HOME | End: 2024-12-04
Attending: STUDENT IN AN ORGANIZED HEALTH CARE EDUCATION/TRAINING PROGRAM
Payer: MEDICARE

## 2024-12-04 DIAGNOSIS — K11.20 SUBMANDIBULAR SIALOADENITIS: Primary | ICD-10-CM

## 2024-12-04 DIAGNOSIS — R59.1 LYMPHADENOPATHY: ICD-10-CM

## 2024-12-04 LAB
CREAT BLD-MCNC: 0.9 MG/DL (ref 0.5–1)
EGFRCR SERPLBLD CKD-EPI 2021: >60 ML/MIN/1.73M2

## 2024-12-04 PROCEDURE — G1010 CDSM STANSON: HCPCS

## 2024-12-04 PROCEDURE — 70491 CT SOFT TISSUE NECK W/DYE: CPT | Mod: MG

## 2024-12-04 PROCEDURE — 82565 ASSAY OF CREATININE: CPT

## 2024-12-04 PROCEDURE — 250N000011 HC RX IP 250 OP 636: Performed by: RADIOLOGY

## 2024-12-04 RX ORDER — IOPAMIDOL 755 MG/ML
75 INJECTION, SOLUTION INTRAVASCULAR ONCE
Status: COMPLETED | OUTPATIENT
Start: 2024-12-04 | End: 2024-12-04

## 2024-12-04 RX ADMIN — IOPAMIDOL 75 ML: 755 INJECTION, SOLUTION INTRAVENOUS at 11:24

## 2024-12-04 NOTE — PROGRESS NOTES
IV placed into patients L AC.  iStat also done at this time.  Creatinine @ 0.9.  CT staff notified.

## 2024-12-05 LAB — B BURGDOR IGG+IGM SER QL: 0.06

## 2024-12-09 ENCOUNTER — TELEPHONE (OUTPATIENT)
Dept: FAMILY MEDICINE | Facility: OTHER | Age: 79
End: 2024-12-09

## 2024-12-09 NOTE — TELEPHONE ENCOUNTER
Called patient to discuss.  Swelling of the submandibular gland has substantially decreased. She can still feel a firm ball the size of a jaw breaker in there.  CT shows possible stone.  I definitely think there is a stone in there.  Since it hasn't passed, it likely will need to be extracted.  Will sent to ENT.  Referral placed.  No need to call patient.  Also, I do not want her on any PCN again.  She requested to be rechallenged after many decades and had allergic reaction.

## 2024-12-09 NOTE — TELEPHONE ENCOUNTER
Spoke with patient, pt stated that she had to stop the ABX that was prescribed last week d/t allergic reaction (mouth swelled and was burning so bad she could not eat). Patient asking for different antibiotic to be prescribed and to have sent to [facility] to be delivered to her.

## 2024-12-09 NOTE — TELEPHONE ENCOUNTER
8:25 AM    Reason for Call: Phone Call    Description: Patient is calling stating she was seen by , last week and she has a couple questions. PLease call patient back.    Was an appointment offered for this call? No  If yes : Appointment type              Date    Preferred method for responding to this message: Telephone Call  What is your phone number ?  [phone]    If we cannot reach you directly, may we leave a detailed response at the number you provided? Yes    Can this message wait until your PCP/provider returns, if available today? YES, Provider is in today    Sharlene Travis

## 2024-12-16 ENCOUNTER — OFFICE VISIT (OUTPATIENT)
Dept: OTOLARYNGOLOGY | Facility: OTHER | Age: 79
End: 2024-12-16
Attending: STUDENT IN AN ORGANIZED HEALTH CARE EDUCATION/TRAINING PROGRAM
Payer: MEDICARE

## 2024-12-16 VITALS
RESPIRATION RATE: 16 BRPM | HEART RATE: 65 BPM | OXYGEN SATURATION: 99 % | DIASTOLIC BLOOD PRESSURE: 80 MMHG | BODY MASS INDEX: 24.02 KG/M2 | SYSTOLIC BLOOD PRESSURE: 156 MMHG | TEMPERATURE: 97 F | WEIGHT: 144.18 LBS | HEIGHT: 65 IN

## 2024-12-16 DIAGNOSIS — K11.20 SUBMANDIBULAR SIALOADENITIS: ICD-10-CM

## 2024-12-16 DIAGNOSIS — K11.23 CHRONIC SIALOADENITIS: ICD-10-CM

## 2024-12-16 DIAGNOSIS — K11.5 SIALOLITHIASIS OF SUBMANDIBULAR GLAND: ICD-10-CM

## 2024-12-16 DIAGNOSIS — R68.2 DRY MOUTH: Primary | ICD-10-CM

## 2024-12-16 PROCEDURE — 42650 DILATION OF SALIVARY DUCT: CPT | Performed by: OTOLARYNGOLOGY

## 2024-12-16 PROCEDURE — 86235 NUCLEAR ANTIGEN ANTIBODY: CPT | Mod: ZL | Performed by: OTOLARYNGOLOGY

## 2024-12-16 PROCEDURE — 36415 COLL VENOUS BLD VENIPUNCTURE: CPT | Mod: ZL | Performed by: OTOLARYNGOLOGY

## 2024-12-16 PROCEDURE — G0463 HOSPITAL OUTPT CLINIC VISIT: HCPCS | Mod: 25

## 2024-12-16 RX ORDER — LIDOCAINE HYDROCHLORIDE AND EPINEPHRINE 10; 10 MG/ML; UG/ML
1 INJECTION, SOLUTION INFILTRATION; PERINEURAL ONCE
Status: COMPLETED | OUTPATIENT
Start: 2024-12-16 | End: 2024-12-16

## 2024-12-16 RX ORDER — CLINDAMYCIN HYDROCHLORIDE 300 MG/1
300 CAPSULE ORAL 3 TIMES DAILY
Qty: 21 CAPSULE | Refills: 0 | Status: SHIPPED | OUTPATIENT
Start: 2024-12-16 | End: 2024-12-23

## 2024-12-16 RX ADMIN — LIDOCAINE HYDROCHLORIDE AND EPINEPHRINE 1 ML: 10; 10 INJECTION, SOLUTION INFILTRATION; PERINEURAL at 11:31

## 2024-12-16 ASSESSMENT — PAIN SCALES - GENERAL: PAINLEVEL_OUTOF10: NO PAIN (0)

## 2024-12-16 NOTE — PATIENT INSTRUCTIONS
Complete labs today  Complete Clindamycin    Rinse with 1/2 hydrogen peroxide, 1/2 water solution 3 times a day after meals and before bed for three days.    Maintain adequate oral hydration, warm compresses, sialagogues (lemon wedges, lemon drops, sour candies), gland massage.  1/2 strength peroxide oral rinses if dilation performed today.  Motrin or ibuprofen for 1 week with food.    If you are diabetic watch your blood sugars closely as this will worsen your salivary gland swelling.    Additional imaging may be ordered in the form of CT, MRI or sialogram.  We discussed theories on stone development, including chronic sialadenitis or stricture leading to stagnant saliva and irritation to surrounding ductal tissue which may lead to stones.   If no improvement, proceed with CT.   This was discussed today.  Follow up as needed    Thank you for allowing Dr. iJmenez and our ENT team to participate in your care.  If your medications are too expensive, please give the nurse a call.  We can possibly change this medication.  If you have a scheduling or an appointment question please contact our Health Unit Coordinator at their direct line [phone].   ALL nursing questions or concerns can be directed to your ENT nurse, Travis, at: [phone]

## 2024-12-16 NOTE — LETTER
2024      Freya Lee  [address] [facility] [address]      Dear Colleague,    Thank you for referring your patient, Freya Lee, to the [facility]. Please see a copy of my visit note below.    Otolaryngology Consultation    Patient: Freya Lee  : 1945    Patient presents with:  Ent Problem: Submandibular sialoadenitis// Preeti Trujillo MD Referring      HPI:  Freya Lee is a 79 year old female seen today for Evaluation of her right neck mass.    She initially noted swelling in on .  She presented the ED and given 5 days of prednisone which improved her symptoms.  She then noted recurrence of right SMG swelling in early December.   She saw Dr. Trujillo on 12/3 and tried augmentin with findings of angioedmea.      No DM  She notes chronic dry mouth, dry eyes.    Preceding fever prior to first event.    No facial weakness.   She denies fluctuation in size of the right neck mass with oral intake.  She noted some distasteful oral discharge about 1 month ago.     CT neck dated 2024 shows a diffusely enlarged right submandibular gland with a possible distal stone no concerning lymphadenopathy.  There is a 1.4 cm posterior right thyroid nodule without obvious calcification.          Current Outpatient Rx   Medication Sig Dispense Refill     cholecalciferol (VITAMIN D3) 125 mcg (5000 units) capsule Take 5,000 Units by mouth.       dorzolamide-timolol (COSOPT) 2-0.5 % ophthalmic solution Place 1 drop into both eyes 2 times daily 10 mL 2     latanoprost (XALATAN) 0.005 % ophthalmic solution        levothyroxine (SYNTHROID/LEVOTHROID) 50 MCG tablet Take 1 tablet (50 mcg) by mouth daily 90 tablet 3     losartan (COZAAR) 50 MG tablet TAKE 1 TABLET BY MOUTH DAILY 90 tablet 3     methocarbamol (ROBAXIN) 500 MG tablet Take 1 tablet (500 mg) by mouth 4 times daily as needed 30 tablet 1     Multiple Vitamins-Minerals (EYE VITAMINS PO) Take 1 capsule by mouth daily.       predniSONE  "(DELTASONE) 20 MG tablet Take two tablets (= 40mg) each day for 5 (five) days 10 tablet 0     propranolol (INDERAL) 20 MG tablet Take 1 tablet (20 mg) by mouth 2 times daily as needed (palpitations or racing heart) 30 tablet 1     triamcinolone (KENALOG) 0.1 % external cream Apply topically 2 times daily. 80 g 1     Turmeric, Curcuma Longa, (CURCUMIN EXTRACT) POWD 1 Scoop daily.         Allergies: Lisinopril, Penicillins, Sulfa antibiotics, and Vancomycin     No past medical history on file.    No past surgical history on file.    ENT family history reviewed    Social History     Tobacco Use     Smoking status: Never     Passive exposure: Never     Smokeless tobacco: Never   Vaping Use     Vaping status: Never Used       Review of Systems  ROS: 10 point ROS neg other than the symptoms noted above in the HPI and night sweats hair loss dry skin    Physical Exam  BP (!) 156/80 (BP Location: Left arm, Patient Position: Sitting, Cuff Size: Adult Regular)   Pulse 65   Temp 97  F (36.1  C) (Tympanic)   Resp 16   Ht 1.651 m (5' 5\")   Wt 65.4 kg (144 lb 2.9 oz)   SpO2 99%   BMI 23.99 kg/m    General - The patient is well nourished and well developed, and appears to have good nutritional status.  Alert and oriented to person and place, answers questions and cooperates with examination appropriately.   Head and Face - Normocephalic and atraumatic, with no gross asymmetry noted.  The facial nerve is intact, with strong symmetric movements.  Grade 1 out of 6 bilaterally  Voice and Breathing - The patient was breathing comfortably without the use of accessory muscles. There was no wheezing, stridor, or stertor.  The patients voice was clear and strong, and had appropriate pitch and quality.  No shena peripheral digital clubbing or cyanosis   Ears -The external auditory canals are patent, the tympanic membranes are intact without effusion, retraction or mass.  Bony landmarks are intact.  Eyes - Extraocular movements intact, " and the pupils were reactive to light.  Sclera were not icteric or injected, conjunctiva were pink and moist.  Mouth - Examination of the oral cavity showed pink, healthy oral mucosa. No lesions or ulcerations noted.  The tongue was mobile and midline, and the dentition were in good condition.    Clear saliva from the left submandibular duct, right Sherif's duct with edema and minimal saliva.  No palpable stone.  Bilateral mandibular exostoses  Throat - The walls of the oropharynx were smooth, pink, moist, symmetric, and had no lesions or ulcerations.  The tonsillar pillars and soft palate were symmetric.  The uvula was midline on elevation.  Grade 1 symmetric tonsils  Neck -right firm 3 cm diffusely enlarged mildly tender submandibular gland.   Overlying skin is normal no fluctuance.  Left submandibular gland without enlargement.  No palpable enlarged fixed cervical lymph nodes.  No neck cysts or unusual tenderness to palpation.   No palpable fixed thyroid nodules or concerning goiter.  The trachea is grossly midline.   Nose - External contour is symmetric, no gross deflection or scars.  Nasal mucosa is pink and moist with no abnormal mucus.  The septum and turbinates were evaluated.  No polyps, masses, or purulence noted on examination.      Procedure:    After verbal and written consent was obtained and the risks of right submandibular duct dilation were discussed, including scar formation, recurrent gland swelling, bleeding, numbness, injury to lingual nerve with taste disturbance, need for duct repair or further surgery, the patient was laid in a supine position.  The oral cavity was anesthetized with topical xylocaine and the right submandibular duct was anesthetized with topical  1% lidocaine with 1:100,000 of epinephrine.  Under microscopy, the natural punctum of the submandibular duct was identified.  Staring with the 00 lacrimal probe and using fine forceps to gently grasp the adjacent mucosa, the probe  was advanced to its distal edge.  Obstruction was palpable at the distal portion of the duct.  Dilation proceeded in a serial fashion up to a 4-0 lacrimal probe.  There is a small amount of purulence and small stones removed with gland massage from the patent right [place]'s duct.  The duct was irrigated and suctioned.  Clear saliva was freely expressed from the duct a the close of the procedure and the duct architecture was maintained throughout.   There was minimal blood loss and the patient tolerated the procedure well.         Impression and Plan- Ferya Lee is a 79 year old female with:    ICD-10-CM    1. Dry mouth  R68.2 SSB La DIAZ Antibody IgG     SSA Ro DIAZ Antibody IgG     SSB La DIAZ Antibody IgG     SSA Ro DIAZ Antibody IgG      2. Submandibular sialoadenitis  K11.20 lidocaine 1% with EPINEPHrine 1:100,000 injection 1 mL     Adult ENT  Referral     SSB La DIAZ Antibody IgG     SSA Ro DIAZ Antibody IgG     SSB La DIAZ Antibody IgG     SSA Ro DIAZ Antibody IgG     clindamycin (CLEOCIN) 300 MG capsule     Salivary Duct Probe      3. Chronic sialoadenitis  K11.23 clindamycin (CLEOCIN) 300 MG capsule     Salivary Duct Probe      4. Sialolithiasis of submandibular gland  K11.5           Complete labs today  Complete Clindamycin    Rinse with 1/2 hydrogen peroxide, 1/2 water solution 3 times a day after meals and before bed for three days.    Maintain adequate oral hydration, warm compresses, sialagogues (lemon wedges, lemon drops, sour candies), gland massage.  1/2 strength peroxide oral rinses if dilation performed today.  Motrin or ibuprofen for 1 week with food.    If you are diabetic watch your blood sugars closely as this will worsen your salivary gland swelling.    We discussed theories on stone development, including chronic sialadenitis or stricture leading to stagnant saliva and irritation to surrounding ductal tissue which may lead to stones.   If no improvement, referral for right submandibular  gland excision.  This was discussed today.  Follow up as needed      Rose Jimenez D.O.  Otolaryngology/Head and Neck Surgery  Allergy      Again, thank you for allowing me to participate in the care of your patient.        Sincerely,        Rose Jimenez MD

## 2024-12-16 NOTE — PROGRESS NOTES
Otolaryngology Consultation    Patient: Freya Lee  : 1945    Patient presents with:  Ent Problem: Submandibular sialoadenitis// Preeti Trujillo MD Referring      HPI:  Freya Lee is a 79 year old female seen today for Evaluation of her right neck mass.    She initially noted swelling in on .  She presented the ED and given 5 days of prednisone which improved her symptoms.  She then noted recurrence of right SMG swelling in early December.   She saw Dr. Trujillo on 12/3 and tried augmentin with findings of angioedmea.      No DM  She notes chronic dry mouth, dry eyes.    Preceding fever prior to first event.    No facial weakness.   She denies fluctuation in size of the right neck mass with oral intake.  She noted some distasteful oral discharge about 1 month ago.     CT neck dated 2024 shows a diffusely enlarged right submandibular gland with a possible distal stone no concerning lymphadenopathy.  There is a 1.4 cm posterior right thyroid nodule without obvious calcification.          Current Outpatient Rx   Medication Sig Dispense Refill    cholecalciferol (VITAMIN D3) 125 mcg (5000 units) capsule Take 5,000 Units by mouth.      dorzolamide-timolol (COSOPT) 2-0.5 % ophthalmic solution Place 1 drop into both eyes 2 times daily 10 mL 2    latanoprost (XALATAN) 0.005 % ophthalmic solution       levothyroxine (SYNTHROID/LEVOTHROID) 50 MCG tablet Take 1 tablet (50 mcg) by mouth daily 90 tablet 3    losartan (COZAAR) 50 MG tablet TAKE 1 TABLET BY MOUTH DAILY 90 tablet 3    methocarbamol (ROBAXIN) 500 MG tablet Take 1 tablet (500 mg) by mouth 4 times daily as needed 30 tablet 1    Multiple Vitamins-Minerals (EYE VITAMINS PO) Take 1 capsule by mouth daily.      predniSONE (DELTASONE) 20 MG tablet Take two tablets (= 40mg) each day for 5 (five) days 10 tablet 0    propranolol (INDERAL) 20 MG tablet Take 1 tablet (20 mg) by mouth 2 times daily as needed (palpitations or racing heart) 30 tablet 1     "triamcinolone (KENALOG) 0.1 % external cream Apply topically 2 times daily. 80 g 1    Turmeric, Curcuma Longa, (CURCUMIN EXTRACT) POWD 1 Scoop daily.         Allergies: Lisinopril, Penicillins, Sulfa antibiotics, and Vancomycin     No past medical history on file.    No past surgical history on file.    ENT family history reviewed    Social History     Tobacco Use    Smoking status: Never     Passive exposure: Never    Smokeless tobacco: Never   Vaping Use    Vaping status: Never Used       Review of Systems  ROS: 10 point ROS neg other than the symptoms noted above in the HPI and night sweats hair loss dry skin    Physical Exam  BP (!) 156/80 (BP Location: Left arm, Patient Position: Sitting, Cuff Size: Adult Regular)   Pulse 65   Temp 97  F (36.1  C) (Tympanic)   Resp 16   Ht 1.651 m (5' 5\")   Wt 65.4 kg (144 lb 2.9 oz)   SpO2 99%   BMI 23.99 kg/m    General - The patient is well nourished and well developed, and appears to have good nutritional status.  Alert and oriented to person and place, answers questions and cooperates with examination appropriately.   Head and Face - Normocephalic and atraumatic, with no gross asymmetry noted.  The facial nerve is intact, with strong symmetric movements.  Grade 1 out of 6 bilaterally  Voice and Breathing - The patient was breathing comfortably without the use of accessory muscles. There was no wheezing, stridor, or stertor.  The patients voice was clear and strong, and had appropriate pitch and quality.  No shena peripheral digital clubbing or cyanosis   Ears -The external auditory canals are patent, the tympanic membranes are intact without effusion, retraction or mass.  Bony landmarks are intact.  Eyes - Extraocular movements intact, and the pupils were reactive to light.  Sclera were not icteric or injected, conjunctiva were pink and moist.  Mouth - Examination of the oral cavity showed pink, healthy oral mucosa. No lesions or ulcerations noted.  The tongue was " mobile and midline, and the dentition were in good condition.    Clear saliva from the left submandibular duct, right Sherif's duct with edema and minimal saliva.  No palpable stone.  Bilateral mandibular exostoses  Throat - The walls of the oropharynx were smooth, pink, moist, symmetric, and had no lesions or ulcerations.  The tonsillar pillars and soft palate were symmetric.  The uvula was midline on elevation.  Grade 1 symmetric tonsils  Neck -right firm 3 cm diffusely enlarged mildly tender submandibular gland.   Overlying skin is normal no fluctuance.  Left submandibular gland without enlargement.  No palpable enlarged fixed cervical lymph nodes.  No neck cysts or unusual tenderness to palpation.   No palpable fixed thyroid nodules or concerning goiter.  The trachea is grossly midline.   Nose - External contour is symmetric, no gross deflection or scars.  Nasal mucosa is pink and moist with no abnormal mucus.  The septum and turbinates were evaluated.  No polyps, masses, or purulence noted on examination.      Procedure:    After verbal and written consent was obtained and the risks of right submandibular duct dilation were discussed, including scar formation, recurrent gland swelling, bleeding, numbness, injury to lingual nerve with taste disturbance, need for duct repair or further surgery, the patient was laid in a supine position.  The oral cavity was anesthetized with topical xylocaine and the right submandibular duct was anesthetized with topical  1% lidocaine with 1:100,000 of epinephrine.  Under microscopy, the natural punctum of the submandibular duct was identified.  Staring with the 00 lacrimal probe and using fine forceps to gently grasp the adjacent mucosa, the probe was advanced to its distal edge.  Obstruction was palpable at the distal portion of the duct.  Dilation proceeded in a serial fashion up to a 4-0 lacrimal probe.  There is a small amount of purulence and small stones removed with  gland massage from the patent right [place]'s duct.  The duct was irrigated and suctioned.  Clear saliva was freely expressed from the duct a the close of the procedure and the duct architecture was maintained throughout.   There was minimal blood loss and the patient tolerated the procedure well.         Impression and Plan- Freya Lee is a 79 year old female with:    ICD-10-CM    1. Dry mouth  R68.2 SSB La DIAZ Antibody IgG     SSA Ro DIAZ Antibody IgG     SSB La DIAZ Antibody IgG     SSA Ro DIAZ Antibody IgG      2. Submandibular sialoadenitis  K11.20 lidocaine 1% with EPINEPHrine 1:100,000 injection 1 mL     Adult ENT  Referral     SSB La DIAZ Antibody IgG     SSA Ro DIAZ Antibody IgG     SSB La DIAZ Antibody IgG     SSA Ro DIAZ Antibody IgG     clindamycin (CLEOCIN) 300 MG capsule     Salivary Duct Probe      3. Chronic sialoadenitis  K11.23 clindamycin (CLEOCIN) 300 MG capsule     Salivary Duct Probe      4. Sialolithiasis of submandibular gland  K11.5           Complete labs today  Complete Clindamycin    Rinse with 1/2 hydrogen peroxide, 1/2 water solution 3 times a day after meals and before bed for three days.    Maintain adequate oral hydration, warm compresses, sialagogues (lemon wedges, lemon drops, sour candies), gland massage.  1/2 strength peroxide oral rinses if dilation performed today.  Motrin or ibuprofen for 1 week with food.    If you are diabetic watch your blood sugars closely as this will worsen your salivary gland swelling.    We discussed theories on stone development, including chronic sialadenitis or stricture leading to stagnant saliva and irritation to surrounding ductal tissue which may lead to stones.   If no improvement, referral for right submandibular gland excision.  This was discussed today.  Follow up as needed      Rose Jimenez D.O.  Otolaryngology/Head and Neck Surgery  Allergy

## 2024-12-18 LAB
ENA SS-A AB SER IA-ACNC: <0.5 U/ML
ENA SS-A AB SER IA-ACNC: NEGATIVE
ENA SS-B IGG SER IA-ACNC: <0.6 U/ML
ENA SS-B IGG SER IA-ACNC: NEGATIVE

## 2025-01-07 ENCOUNTER — TELEPHONE (OUTPATIENT)
Dept: FAMILY MEDICINE | Facility: OTHER | Age: 80
End: 2025-01-07

## 2025-01-07 ENCOUNTER — ANCILLARY PROCEDURE (OUTPATIENT)
Dept: GENERAL RADIOLOGY | Facility: OTHER | Age: 80
End: 2025-01-07
Attending: NURSE PRACTITIONER
Payer: MEDICARE

## 2025-01-07 ENCOUNTER — LAB (OUTPATIENT)
Dept: LAB | Facility: OTHER | Age: 80
End: 2025-01-07
Attending: NURSE PRACTITIONER
Payer: MEDICARE

## 2025-01-07 ENCOUNTER — OFFICE VISIT (OUTPATIENT)
Dept: FAMILY MEDICINE | Facility: OTHER | Age: 80
End: 2025-01-07
Attending: NURSE PRACTITIONER
Payer: MEDICARE

## 2025-01-07 VITALS
WEIGHT: 140 LBS | RESPIRATION RATE: 16 BRPM | DIASTOLIC BLOOD PRESSURE: 81 MMHG | HEART RATE: 78 BPM | SYSTOLIC BLOOD PRESSURE: 138 MMHG | OXYGEN SATURATION: 99 % | TEMPERATURE: 99.1 F | BODY MASS INDEX: 23.3 KG/M2

## 2025-01-07 DIAGNOSIS — B34.9 VIRAL ILLNESS: Primary | ICD-10-CM

## 2025-01-07 DIAGNOSIS — B34.9 VIRAL ILLNESS: ICD-10-CM

## 2025-01-07 DIAGNOSIS — E03.9 ACQUIRED HYPOTHYROIDISM: ICD-10-CM

## 2025-01-07 DIAGNOSIS — L98.9 FACIAL LESION: ICD-10-CM

## 2025-01-07 DIAGNOSIS — R06.02 SHORTNESS OF BREATH: ICD-10-CM

## 2025-01-07 LAB
ALBUMIN SERPL BCG-MCNC: 4.2 G/DL (ref 3.5–5.2)
ALP SERPL-CCNC: 72 U/L (ref 40–150)
ALT SERPL W P-5'-P-CCNC: 14 U/L (ref 0–50)
ANION GAP SERPL CALCULATED.3IONS-SCNC: 12 MMOL/L (ref 7–15)
AST SERPL W P-5'-P-CCNC: 20 U/L (ref 0–45)
BASOPHILS # BLD AUTO: 0 10E3/UL (ref 0–0.2)
BASOPHILS NFR BLD AUTO: 0 %
BILIRUB SERPL-MCNC: 0.5 MG/DL
BUN SERPL-MCNC: 13.2 MG/DL (ref 8–23)
CALCIUM SERPL-MCNC: 9.4 MG/DL (ref 8.8–10.4)
CHLORIDE SERPL-SCNC: 104 MMOL/L (ref 98–107)
CREAT SERPL-MCNC: 0.75 MG/DL (ref 0.51–0.95)
EGFRCR SERPLBLD CKD-EPI 2021: 80 ML/MIN/1.73M2
EOSINOPHIL # BLD AUTO: 0.1 10E3/UL (ref 0–0.7)
EOSINOPHIL NFR BLD AUTO: 1 %
ERYTHROCYTE [DISTWIDTH] IN BLOOD BY AUTOMATED COUNT: 12.4 % (ref 10–15)
GLUCOSE SERPL-MCNC: 120 MG/DL (ref 70–99)
HCO3 SERPL-SCNC: 26 MMOL/L (ref 22–29)
HCT VFR BLD AUTO: 35.7 % (ref 35–47)
HGB BLD-MCNC: 11.8 G/DL (ref 11.7–15.7)
IMM GRANULOCYTES # BLD: 0 10E3/UL
IMM GRANULOCYTES NFR BLD: 0 %
LYMPHOCYTES # BLD AUTO: 1.9 10E3/UL (ref 0.8–5.3)
LYMPHOCYTES NFR BLD AUTO: 26 %
MCH RBC QN AUTO: 30.6 PG (ref 26.5–33)
MCHC RBC AUTO-ENTMCNC: 33.1 G/DL (ref 31.5–36.5)
MCV RBC AUTO: 93 FL (ref 78–100)
MONOCYTES # BLD AUTO: 0.6 10E3/UL (ref 0–1.3)
MONOCYTES NFR BLD AUTO: 8 %
NEUTROPHILS # BLD AUTO: 4.9 10E3/UL (ref 1.6–8.3)
NEUTROPHILS NFR BLD AUTO: 65 %
NRBC # BLD AUTO: 0 10E3/UL
NRBC BLD AUTO-RTO: 0 /100
PLATELET # BLD AUTO: 223 10E3/UL (ref 150–450)
POTASSIUM SERPL-SCNC: 4.3 MMOL/L (ref 3.4–5.3)
PROT SERPL-MCNC: 7.2 G/DL (ref 6.4–8.3)
RBC # BLD AUTO: 3.85 10E6/UL (ref 3.8–5.2)
SODIUM SERPL-SCNC: 142 MMOL/L (ref 135–145)
TSH SERPL DL<=0.005 MIU/L-ACNC: 1.42 UIU/ML (ref 0.3–4.2)
WBC # BLD AUTO: 7.6 10E3/UL (ref 4–11)

## 2025-01-07 PROCEDURE — 82040 ASSAY OF SERUM ALBUMIN: CPT | Mod: ZL

## 2025-01-07 PROCEDURE — 84155 ASSAY OF PROTEIN SERUM: CPT | Mod: ZL

## 2025-01-07 PROCEDURE — 36415 COLL VENOUS BLD VENIPUNCTURE: CPT | Mod: ZL

## 2025-01-07 PROCEDURE — 85004 AUTOMATED DIFF WBC COUNT: CPT | Mod: ZL

## 2025-01-07 PROCEDURE — 71046 X-RAY EXAM CHEST 2 VIEWS: CPT | Mod: TC

## 2025-01-07 PROCEDURE — 85018 HEMOGLOBIN: CPT | Mod: ZL

## 2025-01-07 PROCEDURE — 82310 ASSAY OF CALCIUM: CPT | Mod: ZL

## 2025-01-07 PROCEDURE — 84443 ASSAY THYROID STIM HORMONE: CPT | Mod: ZL

## 2025-01-07 RX ORDER — LEVOTHYROXINE SODIUM 50 UG/1
50 TABLET ORAL DAILY
Qty: 90 TABLET | Refills: 3 | Status: SHIPPED | OUTPATIENT
Start: 2025-01-07

## 2025-01-07 ASSESSMENT — PAIN SCALES - GENERAL: PAINLEVEL_OUTOF10: NO PAIN (0)

## 2025-01-07 NOTE — TELEPHONE ENCOUNTER
9:12 AM    Reason for Call: OVERBOOK    Patient is having the following symptoms: Patient needs to be seen  for bad flu/lots of phlegm, fever, cough. Concerned because of her age. days.    The patient is requesting an appointment for Overbook with Gretchen Sagastume.    Was an appointment offered for this call? No  If yes : Appointment type              Date    Preferred method for responding to this message: Telephone Call  What is your phone number ?  [phone]    If we cannot reach you directly, may we leave a detailed response at the number you provided? Yes    Can this message wait until your PCP/provider returns, if unavailable today? Provider is in    [facility]

## 2025-01-07 NOTE — TELEPHONE ENCOUNTER
Ok to schedule for 4 but should come in at 3 for labs and imaging prior to being seen.  Can she be triaged to make sure she is ok to wait until this afternoon and not needing to go to ER    Gretchen AVILES FNP-BC  Family Nurse Practitioner

## 2025-01-07 NOTE — TELEPHONE ENCOUNTER
Spoke with patient, has been sick about 6 days. Was with someone with confirmed case of influenza A. Symptoms are improving, today was the first day she had energy to get out of bed and get dressed. No SOB. Would like to be checked out due to cough and phlegm.   Scheduled for clinic visit and lab.   Please order labs and imaging.

## 2025-01-07 NOTE — PROGRESS NOTES
Assessment & Plan     Viral illness  Exposed to influenza A  Over 5 days since symptoms started out of the window for tamiflu  No sign of pneumonia on chest XR and CBC normal  Symptoms are starting to improve - continue with symptomatic treatment   No wheezing noted today - if starts again consider inhaler   - CBC with platelets and differential; Future  - XR CHEST 2 VW (Clinic Performed); Future  - Influenza A/B, RSV and SARS-CoV2 PCR (COVID-19); Future  - Comprehensive metabolic panel (BMP + Alb, Alk Phos, ALT, AST, Total. Bili, TP); Future    Shortness of breath  Exposed to influenza A   SOB is improving - no sign of respiratory distress and oxygen level 99% on room air   - Influenza A/B, RSV and SARS-CoV2 PCR (COVID-19); Future    Acquired hypothyroidism  Normal TSH sent in levothyroxine for the year   - levothyroxine (SYNTHROID/LEVOTHROID) 50 MCG tablet; Take 1 tablet (50 mcg) by mouth daily.  - TSH with free T4 reflex; Future    Facial lesion  History of pre-cancerous lesion to her face.  Freya developed a lesion around the right upper lip bleeding at times.  Referral back to [facility] derm   - Adult Dermatology  Referral; Future    The longitudinal plan of care for the diagnosis(es)/condition(s) as documented were addressed during this visit. Due to the added complexity in care, I will continue to support Freya in the subsequent management and with ongoing continuity of care.    Ordering of each unique test  No LOS data to display   Time spent by me today doing chart review, history and exam, documentation and further activities per the note        See Patient Instructions    No follow-ups on file.    Subjective   Freya is a 79 year old, presenting for the following health issues:  Flu (Influenza A Exposure )        1/7/2025     2:59 PM   Additional Questions   Roomed by Sean Catherine   Accompanied by None         1/7/2025     2:59 PM   Patient Reported Additional Medications   Patient  reports taking the following new medications None     HPI     Acute Illness  Acute illness concerns: Cough, fever, headache and body aches   Onset/Duration: 1/2/2024  Symptoms:  Fever: YES  Chills/Sweats: YES  Headache (location?): YES  Sinus Pressure: No  Conjunctivitis:  No  Ear Pain: no  Rhinorrhea: YES  Congestion: YES  Sore Throat: No  Cough: YES-productive of yellow sputum, productive of green sputum  Wheeze: YES  Decreased Appetite: YES  Nausea: No  Vomiting: No  Diarrhea: No  Dysuria/Freq.: No  Dysuria or Hematuria: No  Fatigue/Achiness: YES  Sick/Strep Exposure: YES- Exposure to Influenza A   Therapies tried and outcome: Rest, advil, OTC cough medication and tea        Review of Systems  CONSTITUTIONAL:fatigue and fever and weakness   ENT/MOUTH: post nasal drip and congestion   RESP:cough and SOB  CV: NEGATIVE for chest pain, palpitations or peripheral edema      Objective    /81 (BP Location: Right arm, Patient Position: Sitting, Cuff Size: Adult Regular)   Pulse 78   Temp 99.1  F (37.3  C) (Tympanic)   Resp 16   Wt 63.5 kg (140 lb)   SpO2 99%   BMI 23.30 kg/m    Body mass index is 23.3 kg/m .  Physical Exam   GENERAL: alert and ill appearing - not toxic and no distress  RESP: lungs clear to auscultation - no rales, rhonchi or wheezes  CV: regular rate and rhythm, normal S1 S2, no S3 or S4, no murmur, click or rub, no peripheral edema  ABDOMEN: soft, nontender, no hepatosplenomegaly, no masses and bowel sounds normal    Results for orders placed or performed in visit on 01/07/25   Comprehensive metabolic panel (BMP + Alb, Alk Phos, ALT, AST, Total. Bili, TP)     Status: Abnormal   Result Value Ref Range    Sodium 142 135 - 145 mmol/L    Potassium 4.3 3.4 - 5.3 mmol/L    Carbon Dioxide (CO2) 26 22 - 29 mmol/L    Anion Gap 12 7 - 15 mmol/L    Urea Nitrogen 13.2 8.0 - 23.0 mg/dL    Creatinine 0.75 0.51 - 0.95 mg/dL    GFR Estimate 80 >60 mL/min/1.73m2    Calcium 9.4 8.8 - 10.4 mg/dL    Chloride  104 98 - 107 mmol/L    Glucose 120 (H) 70 - 99 mg/dL    Alkaline Phosphatase 72 40 - 150 U/L    AST 20 0 - 45 U/L    ALT 14 0 - 50 U/L    Protein Total 7.2 6.4 - 8.3 g/dL    Albumin 4.2 3.5 - 5.2 g/dL    Bilirubin Total 0.5 <=1.2 mg/dL   CBC with platelets and differential     Status: None   Result Value Ref Range    WBC Count 7.6 4.0 - 11.0 10e3/uL    RBC Count 3.85 3.80 - 5.20 10e6/uL    Hemoglobin 11.8 11.7 - 15.7 g/dL    Hematocrit 35.7 35.0 - 47.0 %    MCV 93 78 - 100 fL    MCH 30.6 26.5 - 33.0 pg    MCHC 33.1 31.5 - 36.5 g/dL    RDW 12.4 10.0 - 15.0 %    Platelet Count 223 150 - 450 10e3/uL    % Neutrophils 65 %    % Lymphocytes 26 %    % Monocytes 8 %    % Eosinophils 1 %    % Basophils 0 %    % Immature Granulocytes 0 %    NRBCs per 100 WBC 0 <1 /100    Absolute Neutrophils 4.9 1.6 - 8.3 10e3/uL    Absolute Lymphocytes 1.9 0.8 - 5.3 10e3/uL    Absolute Monocytes 0.6 0.0 - 1.3 10e3/uL    Absolute Eosinophils 0.1 0.0 - 0.7 10e3/uL    Absolute Basophils 0.0 0.0 - 0.2 10e3/uL    Absolute Immature Granulocytes 0.0 <=0.4 10e3/uL    Absolute NRBCs 0.0 10e3/uL   TSH with free T4 reflex     Status: Normal   Result Value Ref Range    TSH 1.42 0.30 - 4.20 uIU/mL   CBC with platelets and differential     Status: None    Narrative    The following orders were created for panel order CBC with platelets and differential.  Procedure                               Abnormality         Status                     ---------                               -----------         ------                     CBC with platelets and d...[[id number]]                      Final result                 Please view results for these tests on the individual orders.   Results for orders placed or performed in visit on 01/07/25   XR CHEST 2 VW (Clinic Performed)     Status: None    Narrative    PROCEDURE:  XR CHEST 2 VIEWS    HISTORY:  Viral illness.     COMPARISON:  None.    FINDINGS:   The cardiac silhouette is normal in size. The pulmonary  vasculature is  normal.  The lungs are clear. No pleural effusion or pneumothorax.      Impression    IMPRESSION:  No acute cardiopulmonary disease.      ADEBAYO HATFIELD MD         SYSTEM ID:  [id number]           Signed Electronically by: STEFAN Shearer CNP

## 2025-02-03 ENCOUNTER — OFFICE VISIT (OUTPATIENT)
Dept: FAMILY MEDICINE | Facility: OTHER | Age: 80
End: 2025-02-03
Attending: NURSE PRACTITIONER
Payer: MEDICARE

## 2025-02-03 VITALS
SYSTOLIC BLOOD PRESSURE: 136 MMHG | TEMPERATURE: 98 F | HEART RATE: 80 BPM | RESPIRATION RATE: 16 BRPM | DIASTOLIC BLOOD PRESSURE: 74 MMHG | OXYGEN SATURATION: 99 % | WEIGHT: 139.6 LBS | BODY MASS INDEX: 23.26 KG/M2 | HEIGHT: 65 IN

## 2025-02-03 DIAGNOSIS — R53.82 CHRONIC FATIGUE: ICD-10-CM

## 2025-02-03 DIAGNOSIS — M54.50 RIGHT-SIDED LOW BACK PAIN WITHOUT SCIATICA, UNSPECIFIED CHRONICITY: ICD-10-CM

## 2025-02-03 DIAGNOSIS — Z00.00 ENCOUNTER FOR MEDICARE ANNUAL WELLNESS EXAM: Primary | ICD-10-CM

## 2025-02-03 DIAGNOSIS — M85.80 OSTEOPENIA, UNSPECIFIED LOCATION: ICD-10-CM

## 2025-02-03 DIAGNOSIS — G93.31 POST VIRAL SYNDROME: ICD-10-CM

## 2025-02-03 DIAGNOSIS — E55.9 VITAMIN D DEFICIENCY: ICD-10-CM

## 2025-02-03 DIAGNOSIS — E03.9 ACQUIRED HYPOTHYROIDISM: ICD-10-CM

## 2025-02-03 LAB
VIT B12 SERPL-MCNC: 850 PG/ML (ref 232–1245)
VIT D+METAB SERPL-MCNC: 59 NG/ML (ref 20–50)

## 2025-02-03 PROCEDURE — G0463 HOSPITAL OUTPT CLINIC VISIT: HCPCS

## 2025-02-03 PROCEDURE — 36415 COLL VENOUS BLD VENIPUNCTURE: CPT | Mod: ZL | Performed by: NURSE PRACTITIONER

## 2025-02-03 PROCEDURE — 82607 VITAMIN B-12: CPT | Mod: ZL | Performed by: NURSE PRACTITIONER

## 2025-02-03 PROCEDURE — 82306 VITAMIN D 25 HYDROXY: CPT | Mod: ZL | Performed by: NURSE PRACTITIONER

## 2025-02-03 RX ORDER — METHOCARBAMOL 500 MG/1
500 TABLET, FILM COATED ORAL 4 TIMES DAILY PRN
Qty: 30 TABLET | Refills: 5 | Status: SHIPPED | OUTPATIENT
Start: 2025-02-03

## 2025-02-03 SDOH — HEALTH STABILITY: PHYSICAL HEALTH: ON AVERAGE, HOW MANY MINUTES DO YOU ENGAGE IN EXERCISE AT THIS LEVEL?: 30 MIN

## 2025-02-03 SDOH — HEALTH STABILITY: PHYSICAL HEALTH: ON AVERAGE, HOW MANY DAYS PER WEEK DO YOU ENGAGE IN MODERATE TO STRENUOUS EXERCISE (LIKE A BRISK WALK)?: 1 DAY

## 2025-02-03 ASSESSMENT — PAIN SCALES - GENERAL: PAINLEVEL_OUTOF10: MODERATE PAIN (4)

## 2025-02-03 ASSESSMENT — SOCIAL DETERMINANTS OF HEALTH (SDOH): HOW OFTEN DO YOU GET TOGETHER WITH FRIENDS OR RELATIVES?: TWICE A WEEK

## 2025-02-03 NOTE — PROGRESS NOTES
Preventive Care Visit  [facility]  STEFAN Shearer CNP, Family Medicine  Feb 3, 2025      Assessment & Plan     Encounter for Medicare annual wellness exam  Continue yearly wellness visit     Acquired hypothyroidism  Last TSH normal - continue current treatment for a year     Post viral syndrome  Recent influenza A followed by gastroenteritis  Freya continues to complain of increased fatigue and some dizziness since illness.    VSS stable, lungs clear  Consider PT if dizziness and/or post viral fatigue continue     Chronic fatigue  Concerns for low B12 - would like to consider supplement   Worsening fatigue since recent viral illness   - Vitamin B12; Future  - Vitamin B12    Vitamin D deficiency  Due for updating labs  Current vitamin D3 dose 5000 units during the winter   - Vitamin D Deficiency; Future  - Vitamin D Deficiency    Osteopenia, unspecified location  History of osteopenia.  Continues with healthy diet and taking vitamin D3 supplement daily     Right-sided low back pain without sciatica, unspecified chronicity  Intermittent use of Robaxin - ok to use as needed   Medication refilled   - methocarbamol (ROBAXIN) 500 MG tablet; Take 1 tablet (500 mg) by mouth 4 times daily as needed for muscle spasms.    Patient has been advised of split billing requirements and indicates understanding: Yes        Counseling  Appropriate preventive services were addressed with this patient via screening, questionnaire, or discussion as appropriate for fall prevention, nutrition, physical activity, Tobacco-use cessation, social engagement, weight loss and cognition.  Checklist reviewing preventive services available has been given to the patient.  Reviewed patient's diet, addressing concerns and/or questions.   She is at risk for lack of exercise and has been provided with information to increase physical activity for the benefit of her well-being.   Discussed possible causes of fatigue. The patient was  provided with written information regarding signs of hearing loss.       See Patient Instructions    Return in about 1 year (around 2/3/2026) for Physical Exam.    Saul Pillai is a 79 year old, presenting for the following:  Thyroid Problem, Derm Problem, Hair Loss, and Medicare Visit        2/3/2025    10:11 AM   Additional Questions   Roomed by Sweetie KENT   Accompanied by self         2/3/2025    10:11 AM   Patient Reported Additional Medications   Patient reports taking the following new medications none           2/3/2025   Substance Use   Alcohol more than 3/day or more than 7/wk No   Do you have a current opioid prescription? No   How severe/bad is pain from 1 to 10? 3/10   Do you use any other substances recreationally? No         History of Present Illness       Reason for visit:  Year checkup   She is taking medications regularly.    Hypertension Follow-up    Do you check your blood pressure regularly outside of the clinic? No   Are you following a low salt diet? No  Are your blood pressures ever more than 140 on the top number (systolic) OR more   than 90 on the bottom number (diastolic), for example 140/90? Yes    Hypothyroidism Follow-up    Since last visit, patient describes the following symptoms dry skin, and hair loss    Continues to have post viral symptoms   Fatigued easily - normal TSH level  Has had some increased hair loss and fatigued easily         Health Care Directive  Patient does not have a Health Care Directive: Patient states has Advance Directive and will bring in a copy to clinic.       No data to display                   No data to display                   No data to display                  1/2/2024   Social Factors   Worry food won't last until get money to buy more No   Food not last or not have enough money for food? No   Do you have housing? (Housing is defined as stable permanent housing and does not include staying ouside in a car, in a tent, in an abandoned building,  in an overnight shelter, or couch-surfing.) Yes   Are you worried about losing your housing? No   Lack of transportation? No   Unable to get utilities (heat,electricity)? No         2/3/2025   Fall Risk   Fallen 2 or more times in the past year? No     No   Trouble with walking or balance? Yes     No       Proxy-reported    Multiple values from one day are sorted in reverse-chronological order            No data to display                   No data to display                   No data to display                     No data to display                     Today's PHQ-2 Score:       2/3/2025    10:03 AM   PHQ-2 ( 1999 [employer])   Q1: Little interest or pleasure in doing things 0   Q2: Feeling down, depressed or hopeless 0   PHQ-2 Score 0    Q1: Little interest or pleasure in doing things Not at all   Q2: Feeling down, depressed or hopeless Not at all   PHQ-2 Score 0       Patient-reported            No data to display              Social History     Tobacco Use    Smoking status: Never     Passive exposure: Never    Smokeless tobacco: Never   Vaping Use    Vaping status: Never Used          Mammogram - last checked 2016 no further mammograms needed unless symptomatic - no concerns today       Osteopenia - DEXA 4/6/21 - osteopenia - currently taking vitamin D3 5000 units daily - does eat a variety of vegetables - encouraged to work on resistance exercises              Reviewed and updated as needed this visit by Provider                    Lab work is in process  BP Readings from Last 3 Encounters:   02/03/25 136/74   01/07/25 138/81   12/16/24 (!) 156/80    Wt Readings from Last 3 Encounters:   02/03/25 63.3 kg (139 lb 9.6 oz)   01/07/25 63.5 kg (140 lb)   12/16/24 65.4 kg (144 lb 2.9 oz)                  Patient Active Problem List   Diagnosis    Aftercare for healing traumatic fracture of lower arm    Abnormal CBC    Acute pain of left shoulder    Allergic rhinitis    Anemia, unspecified    Concussion    Fracture of  5th metatarsal    Depression with anxiety    Health care maintenance    Hypothyroidism    Muscle weakness    Neck pain    Nondisplaced fracture of tibial tuberosity    Other abnormal glucose    Post concussion syndrome    Rosacea    Scalp laceration    Unspecified glaucoma    Vitamin D deficiency    Biliary colic    Dermatitis    Posterior vitreous detachment    Primary open angle glaucoma of left eye    Current mild episode of major depressive disorder without prior episode     No past surgical history on file.    Social History     Tobacco Use    Smoking status: Never     Passive exposure: Never    Smokeless tobacco: Never   Substance Use Topics    Alcohol use: Not on file     No family history on file.      Current Outpatient Medications   Medication Sig Dispense Refill    cholecalciferol (VITAMIN D3) 125 mcg (5000 units) capsule Take 5,000 Units by mouth.      levothyroxine (SYNTHROID/LEVOTHROID) 50 MCG tablet Take 1 tablet (50 mcg) by mouth daily. 90 tablet 3    losartan (COZAAR) 50 MG tablet TAKE 1 TABLET BY MOUTH DAILY 90 tablet 3    methocarbamol (ROBAXIN) 500 MG tablet Take 1 tablet (500 mg) by mouth 4 times daily as needed for muscle spasms. 30 tablet 5    Turmeric, Curcuma Longa, (CURCUMIN EXTRACT) POWD 1 Scoop daily.      dorzolamide-timolol (COSOPT) 2-0.5 % ophthalmic solution Place 1 drop into both eyes 2 times daily (Patient not taking: Reported on 2/3/2025) 10 mL 2    latanoprost (XALATAN) 0.005 % ophthalmic solution  (Patient not taking: Reported on 2/3/2025)      Multiple Vitamins-Minerals (EYE VITAMINS PO) Take 1 capsule by mouth daily. (Patient not taking: Reported on 2/3/2025)      triamcinolone (KENALOG) 0.1 % external cream Apply topically 2 times daily. (Patient not taking: Reported on 2/3/2025) 80 g 1     Allergies   Allergen Reactions    Lisinopril Cough    Penicillins Angioedema     Oral edema, no shortness of breath or wheezing    Sulfa Antibiotics     Vancomycin Other (See Comments)      "Cash syndrome.     Current providers sharing in care for this patient include:  Patient Care Team:  Gretchen Sagastume APRN CNP as PCP - General (Family Medicine)  Gretchen Sagastume APRN CNP as Assigned PCP  Rose Jimenez MD as Assigned Surgical Provider    The following health maintenance items are reviewed in Epic and correct as of today:  Health Maintenance   Topic Date Due    DEPRESSION ACTION PLAN  Never done    RSV VACCINE (1 - 1-dose 75+ series) Never done    COVID-19 Vaccine (8 - 2024-25 season) 04/23/2025    PHQ-9  08/03/2025    BMP  01/07/2026    TSH W/FREE T4 REFLEX  01/07/2026    MEDICARE ANNUAL WELLNESS VISIT  02/03/2026    FALL RISK ASSESSMENT  02/03/2026    DEXA  04/06/2026    ADVANCE CARE PLANNING  02/03/2030    COLONOSCOPY  03/30/2031    DTAP/TDAP/TD IMMUNIZATION (2 - Td or Tdap) 08/09/2032    INFLUENZA VACCINE  Completed    Pneumococcal Vaccine: 50+ Years  Completed    ZOSTER IMMUNIZATION  Completed    HPV IMMUNIZATION  Aged Out    MENINGITIS IMMUNIZATION  Aged Out    RSV MONOCLONAL ANTIBODY  Aged Out         Review of Systems  CONSTITUTIONAL: NEGATIVE for fever, change in weight  Still has chills easily   INTEGUMENTARY/SKIN: raised lesion on back   EYES: visual changes with glaucoma - worsen on the right eye   ENT/MOUTH: NEGATIVE for ear, mouth and throat problems  RESP:SOB with increased Fatigued - recent viral illness   CV: NEGATIVE for chest pain, palpitations or peripheral edema  GI: stomach flu this past week   : denies dysuria   MUSCULOSKELETAL: some mild generalized body aches   NEURO: fatigued   ENDOCRINE: Hx thyroid disease  PSYCHIATRIC: HX anxiety     Objective    Exam  /74 (BP Location: Left arm, Patient Position: Sitting, Cuff Size: Adult Regular)   Pulse 80   Temp 98  F (36.7  C) (Tympanic)   Resp 16   Ht 1.651 m (5' 5\")   Wt 63.3 kg (139 lb 9.6 oz)   SpO2 99%   BMI 23.23 kg/m     Estimated body mass index is 23.23 kg/m  as calculated from the " "following:    Height as of this encounter: 1.651 m (5' 5\").    Weight as of this encounter: 63.3 kg (139 lb 9.6 oz).    Physical Exam  GENERAL: alert and no distress  EYES: Eyes grossly normal to inspection, PERRL and conjunctivae and sclerae normal  HENT: normal cephalic/atraumatic, ear canals and TM's normal, nose and mouth without ulcers or lesions, oropharynx clear, and oral mucous membranes moist  NECK: no adenopathy, no asymmetry, masses, or scars  RESP: lungs clear to auscultation - no rales, rhonchi or wheezes  CV: regular rate and rhythm, normal S1 S2, no S3 or S4, no murmur, click or rub, no peripheral edema  ABDOMEN: soft, nontender, no hepatosplenomegaly, no masses and bowel sounds normal  MS: no gross musculoskeletal defects noted, no edema  SKIN: raised soft lesion to right lower back and scabbed area around same area   NEURO: Normal strength and tone, sensory exam grossly normal, mentation intact, speech normal, and cranial nerves 2-12 intact  PSYCH: mentation appears normal, affect normal/bright  LYMPH: normal ant/post cervical, supraclavicular nodes         2/3/2025   Mini Cog   Clock Draw Score 2 Normal   3 Item Recall 1 object recalled   Mini Cog Total Score 3              Signed Electronically by: STEFAN Shearer CNP    "

## 2025-02-03 NOTE — PATIENT INSTRUCTIONS
Patient Education   Preventive Care Advice   This is general advice given by our system to help you stay healthy. However, your care team may have specific advice just for you. Please talk to your care team about your preventive care needs.  Nutrition  Eat 5 or more servings of fruits and vegetables each day.  Try wheat bread, brown rice and whole grain pasta (instead of white bread, rice, and pasta).  Get enough calcium and vitamin D. Check the label on foods and aim for 100% of the RDA (recommended daily allowance).  Lifestyle  Exercise at least 150 minutes each week  (30 minutes a day, 5 days a week).  Do muscle strengthening activities 2 days a week. These help control your weight and prevent disease.  No smoking.  Wear sunscreen to prevent skin cancer.  Have a dental exam and cleaning every 6 months.  Yearly exams  See your health care team every year to talk about:  Any changes in your health.  Any medicines your care team has prescribed.  Preventive care, family planning, and ways to prevent chronic diseases.  Shots (vaccines)   HPV shots (up to age 26), if you've never had them before.  Hepatitis B shots (up to age 59), if you've never had them before.  COVID-19 shot: Get this shot when it's due.  Flu shot: Get a flu shot every year.  Tetanus shot: Get a tetanus shot every 10 years.  Pneumococcal, hepatitis A, and RSV shots: Ask your care team if you need these based on your risk.  Shingles shot (for age 50 and up)  General health tests  Diabetes screening:  Starting at age 35, Get screened for diabetes at least every 3 years.  If you are younger than age 35, ask your care team if you should be screened for diabetes.  Cholesterol test: At age 39, start having a cholesterol test every 5 years, or more often if advised.  Bone density scan (DEXA): At age 50, ask your care team if you should have this scan for osteoporosis (brittle bones).  Hepatitis C: Get tested at least once in your life.  STIs (sexually  transmitted infections)  Before age 24: Ask your care team if you should be screened for STIs.  After age 24: Get screened for STIs if you're at risk. You are at risk for STIs (including HIV) if:  You are sexually active with more than one person.  You don't use condoms every time.  You or a partner was diagnosed with a sexually transmitted infection.  If you are at risk for HIV, ask about PrEP medicine to prevent HIV.  Get tested for HIV at least once in your life, whether you are at risk for HIV or not.  Cancer screening tests  Cervical cancer screening: If you have a cervix, begin getting regular cervical cancer screening tests starting at age 21.  Breast cancer scan (mammogram): If you've ever had breasts, begin having regular mammograms starting at age 40. This is a scan to check for breast cancer.  Colon cancer screening: It is important to start screening for colon cancer at age 45.  Have a colonoscopy test every 10 years (or more often if you're at risk) Or, ask your provider about stool tests like a FIT test every year or Cologuard test every 3 years.  To learn more about your testing options, visit:   .  For help making a decision, visit:   https://bit.ly/ko95671.  Prostate cancer screening test: If you have a prostate, ask your care team if a prostate cancer screening test (PSA) at age 55 is right for you.  Lung cancer screening: If you are a current or former smoker ages 50 to 80, ask your care team if ongoing lung cancer screenings are right for you.  For informational purposes only. Not to replace the advice of your health care provider. Copyright   2023 [facility]. All rights reserved. Clinically reviewed by the [facility]. [place] [id number] - REV 01/24.  Preventing Falls: Care Instructions  Injuries and health problems such as trouble walking or poor eyesight can increase your risk of falling. So can some medicines. But there are things you can do to help  "prevent falls. You can exercise to get stronger. You can also arrange your home to make it safer.    Talk to your doctor about the medicines you take. Ask if any of them increase the risk of falls and whether they can be changed or stopped.   Try to exercise regularly. It can help improve your strength and balance. This can help lower your risk of falling.         Practice fall safety and prevention.   Wear low-heeled shoes that fit well and give your feet good support. Talk to your doctor if you have foot problems that make this hard.  Carry a cellphone or wear a medical alert device that you can use to call for help.  Use stepladders instead of chairs to reach high objects. Don't climb if you're at risk for falls. Ask for help, if needed.  Wear the correct eyeglasses, if you need them.        Make your home safer.   Remove rugs, cords, clutter, and furniture from walkways.  Keep your house well lit. Use night-lights in hallways and bathrooms.  Install and use sturdy handrails on stairways.  Wear nonskid footwear, even inside. Don't walk barefoot or in socks without shoes.        Be safe outside.   Use handrails, curb cuts, and ramps whenever possible.  Keep your hands free by using a shoulder bag or backpack.  Try to walk in well-lit areas. Watch out for uneven ground, changes in pavement, and debris.  Be careful in the winter. Walk on the grass or gravel when sidewalks are slippery. Use de-icer on steps and walkways. Add non-slip devices to shoes.    Put grab bars and nonskid mats in your shower or tub and near the toilet. Try to use a shower chair or bath bench when bathing.   Get into a tub or shower by putting in your weaker leg first. Get out with your strong side first. Have a phone or medical alert device in the bathroom with you.   Where can you learn more?  Go to https://www.Hububwise.net/patiented  Enter G117 in the search box to learn more about \"Preventing Falls: Care Instructions.\"  Current as of: " July 31, 2024  Content Version: 14.3    2024 [employer].   Care instructions adapted under license by your healthcare professional. If you have questions about a medical condition or this instruction, always ask your healthcare professional. [employer] disclaims any warranty or liability for your use of this information.    Hearing Loss: Care Instructions  Overview     Hearing loss is a sudden or slow decrease in how well you hear. It can range from slight to profound. Permanent hearing loss can occur with aging. It also can happen when you are exposed long-term to loud noise. Examples include listening to loud music, riding motorcycles, or being around other loud machines.  Hearing loss can affect your work and home life. It can make you feel lonely or depressed. You may feel that you have lost your independence. But hearing aids and other devices can help you hear better and feel connected to others.  Follow-up care is a key part of your treatment and safety. Be sure to make and go to all appointments, and call your doctor if you are having problems. It's also a good idea to know your test results and keep a list of the medicines you take.  How can you care for yourself at home?  Avoid loud noises whenever possible. This helps keep your hearing from getting worse.  Always wear hearing protection around loud noises.  Wear a hearing aid as directed.  A professional can help you pick a hearing aid that will work best for you.  You can also get hearing aids over the counter for mild to moderate hearing loss.  Have hearing tests as your doctor suggests. They can show whether your hearing has changed. Your hearing aid may need to be adjusted.  Use other devices as needed. These may include:  Telephone amplifiers and hearing aids that can connect to a television, stereo, radio, or microphone.  Devices that use lights or vibrations. These alert you to the doorbell, a ringing telephone, or a baby  "monitor.  Television closed-captioning. This shows the words at the bottom of the screen. Most new TVs can do this.  TTY (text telephone). This lets you type messages back and forth on the telephone instead of talking or listening. These devices are also called TDD. When messages are typed on the keyboard, they are sent over the phone line to a receiving TTY. The message is shown on a monitor.  Use text messaging, social media, and email if it is hard for you to communicate by telephone.  Try to learn a listening technique called speechreading. It is not lipreading. You pay attention to people's gestures, expressions, posture, and tone of voice. These clues can help you understand what a person is saying. Face the person you are talking to, and have them face you. Make sure the lighting is good. You need to see the other person's face clearly.  Think about counseling if you need help to adjust to your hearing loss.  When should you call for help?  Watch closely for changes in your health, and be sure to contact your doctor if:    You think your hearing is getting worse.     You have new symptoms, such as dizziness or nausea.   Where can you learn more?  Go to https://www.[employer].net/patiented  Enter R798 in the search box to learn more about \"Hearing Loss: Care Instructions.\"  Current as of: September 27, 2023  Content Version: 14.3    2024 [employer].   Care instructions adapted under license by your healthcare professional. If you have questions about a medical condition or this instruction, always ask your healthcare professional. [employer] disclaims any warranty or liability for your use of this information.    Learning About Sleeping Well  What does sleeping well mean?     Sleeping well means getting enough sleep to feel good and stay healthy. How much sleep is enough varies among people.  The number of hours you sleep and how you feel when you wake up are both important. If you do " not feel refreshed, you probably need more sleep. Another sign of not getting enough sleep is feeling tired during the day.  Experts recommend that adults get at least 7 or more hours of sleep per day. Children and older adults need more sleep.  Why is getting enough sleep important?  Getting enough quality sleep is a basic part of good health. When your sleep suffers, your physical health, mood, and your thoughts can suffer too. You may find yourself feeling more grumpy or stressed. Not getting enough sleep also can lead to serious problems, including injury, accidents, anxiety, and depression.  What might cause poor sleeping?  Many things can cause sleep problems, including:  Changes to your sleep schedule.  Stress. Stress can be caused by fear about a single event, such as giving a speech. Or you may have ongoing stress, such as worry about work or school.  Depression, anxiety, and other mental or emotional conditions.  Changes in your sleep habits or surroundings. This includes changes that happen where you sleep, such as noise, light, or sleeping in a different bed. It also includes changes in your sleep pattern, such as having jet lag or working a late shift.  Health problems, such as pain, breathing problems, and restless legs syndrome.  Lack of regular exercise.  Using alcohol, nicotine, or caffeine before bed.  How can you help yourself?  Here are some tips that may help you sleep more soundly and wake up feeling more refreshed.  Your sleeping area   Use your bedroom only for sleeping and sex. A bit of light reading may help you fall asleep. But if it doesn't, do your reading elsewhere in the house. Try not to use your TV, computer, smartphone, or tablet while you are in bed.  Be sure your bed is big enough to stretch out comfortably, especially if you have a sleep partner.  Keep your bedroom quiet, dark, and cool. Use curtains, blinds, or a sleep mask to block out light. To block out noise, use earplugs,  "soothing music, or a \"white noise\" machine.  Your evening and bedtime routine   Create a relaxing bedtime routine. You might want to take a warm shower or bath, or listen to soothing music.  Go to bed at the same time every night. And get up at the same time every morning, even if you feel tired.  What to avoid   Limit caffeine (coffee, tea, caffeinated sodas) during the day, and don't have any for at least 6 hours before bedtime.  Avoid drinking alcohol before bedtime. Alcohol can cause you to wake up more often during the night.  Try not to smoke or use tobacco, especially in the evening. Nicotine can keep you awake.  Limit naps during the day, especially close to bedtime.  Avoid lying in bed awake for too long. If you can't fall asleep or if you wake up in the middle of the night and can't get back to sleep within about 20 minutes, get out of bed and go to another room until you feel sleepy.  Avoid taking medicine right before bed that may keep you awake or make you feel hyper or energized. Your doctor can tell you if your medicine may do this and if you can take it earlier in the day.  If you can't sleep   Imagine yourself in a peaceful, pleasant scene. Focus on the details and feelings of being in a place that is relaxing.  Get up and do a quiet or boring activity until you feel sleepy.  Avoid drinking any liquids before going to bed to help prevent waking up often to use the bathroom.  Where can you learn more?  Go to https://www.[employer].net/patiented  Enter J942 in the search box to learn more about \"Learning About Sleeping Well.\"  Current as of: July 31, 2024  Content Version: 14.3    2024 [employer].   Care instructions adapted under license by your healthcare professional. If you have questions about a medical condition or this instruction, always ask your healthcare professional. [employer] disclaims any warranty or liability for your use of this information.       Slight " vaginal irritation  Try replans or ky gel     Increase protein in your diet - may help with some of the hair loss

## 2025-02-03 NOTE — PROGRESS NOTES
{PROVIDER CHARTING PREFERENCE:045370}    Saul Pillai is a 79 year old, presenting for the following health issues:  No chief complaint on file.  {(!) Visit Details have not yet been documented.  Please enter Visit Details and then use this list to pull in documentation. (Optional):299411}  HPI     Hypertension Follow-up    Do you check your blood pressure regularly outside of the clinic? { :147881}   Are you following a low salt diet? { :496276}  Are your blood pressures ever more than 140 on the top number (systolic) OR more   than 90 on the bottom number (diastolic), for example 140/90? { :538493}    Hypothyroidism Follow-up    Since last visit, patient describes the following symptoms: { :227904}      {additonal problems for provider to add (Optional):317049}    {ROS Picklists (Optional):985399}      Objective    There were no vitals taken for this visit.  There is no height or weight on file to calculate BMI.  Physical Exam   {Exam List (Optional):997894}    {Diagnostic Test Results (Optional):907940}        Signed Electronically by: STEFAN Shearer CNP  {Email feedback regarding this note to [email or web address]   :701124}

## 2025-03-05 ENCOUNTER — OFFICE VISIT (OUTPATIENT)
Dept: FAMILY MEDICINE | Facility: OTHER | Age: 80
End: 2025-03-05
Attending: NURSE PRACTITIONER
Payer: MEDICARE

## 2025-03-05 ENCOUNTER — ANCILLARY PROCEDURE (OUTPATIENT)
Dept: GENERAL RADIOLOGY | Facility: OTHER | Age: 80
End: 2025-03-05
Attending: NURSE PRACTITIONER
Payer: MEDICARE

## 2025-03-05 ENCOUNTER — TELEPHONE (OUTPATIENT)
Dept: FAMILY MEDICINE | Facility: OTHER | Age: 80
End: 2025-03-05

## 2025-03-05 VITALS
SYSTOLIC BLOOD PRESSURE: 150 MMHG | TEMPERATURE: 99.4 F | DIASTOLIC BLOOD PRESSURE: 80 MMHG | HEART RATE: 71 BPM | OXYGEN SATURATION: 98 % | BODY MASS INDEX: 23.16 KG/M2 | HEIGHT: 65 IN | WEIGHT: 139 LBS

## 2025-03-05 DIAGNOSIS — R05.1 ACUTE COUGH: ICD-10-CM

## 2025-03-05 DIAGNOSIS — R05.1 ACUTE COUGH: Primary | ICD-10-CM

## 2025-03-05 DIAGNOSIS — J01.90 ACUTE SINUSITIS WITH SYMPTOMS > 10 DAYS: ICD-10-CM

## 2025-03-05 PROCEDURE — 71046 X-RAY EXAM CHEST 2 VIEWS: CPT | Mod: TC

## 2025-03-05 RX ORDER — DOXYCYCLINE 100 MG/1
100 CAPSULE ORAL 2 TIMES DAILY
Qty: 20 CAPSULE | Refills: 0 | Status: SHIPPED | OUTPATIENT
Start: 2025-03-05 | End: 2025-03-15

## 2025-03-05 RX ORDER — FLUTICASONE PROPIONATE 50 MCG
1 SPRAY, SUSPENSION (ML) NASAL DAILY
Qty: 16 G | Refills: 3 | Status: SHIPPED | OUTPATIENT
Start: 2025-03-05

## 2025-03-05 ASSESSMENT — PAIN SCALES - GENERAL: PAINLEVEL_OUTOF10: NO PAIN (0)

## 2025-03-05 NOTE — TELEPHONE ENCOUNTER
9:29 AM    Reason for Call: OVERBOOK    Patient is having the following symptoms: Bronchitis for 2 weeks.    The patient is requesting an appointment for exam with ELBA Sagastume.    Was an appointment offered for this call? Yes  If yes : Appointment type: 03/11 was too long              Date    Preferred method for responding to this message: Telephone Call  What is your phone number ?  [phone]    If we cannot reach you directly, may we leave a detailed response at the number you provided? Yes    Can this message wait until your PCP/provider returns, if unavailable today? Not applicable    Nydia Leong

## 2025-03-05 NOTE — PATIENT INSTRUCTIONS
Take antibiotic as directed   Use Flonase 1 spray each nostril 2 times daily for 1-2 weeks then if still congestion use daily

## 2025-03-05 NOTE — PROGRESS NOTES
"  {PROVIDER CHARTING PREFERENCE:589629}    Subjective   Freya is a 79 year old, presenting for the following health issues:  URI    URI    History of Present Illness       Reason for visit:  Bronichits  Symptom onset:  More than a month  Symptoms include:  Cough, fatigue, runny nose  Symptom intensity:  Severe  Symptom progression:  Worsening  Had these symptoms before:  No  What makes it worse:  Moving around  What makes it better:  Resting   She is taking medications regularly.        Acute Illness  Acute illness concerns: bronchitis  Onset/Duration: a little over a month ago  Symptoms:  Fever: YES-  feeling ferverish  Chills/Sweats: YES  Headache (location?): YES- Off and on, in the temples.   Sinus Pressure: YES  Conjunctivitis:  No  Ear Pain: no  Rhinorrhea: YES  Congestion: No  Sore Throat: No  Cough: YES-productive of yellow sputum, with shortness of breath, worsening over time  Wheeze: YES  Decreased Appetite: No  Nausea: No  Vomiting: No  Diarrhea: No  Dysuria/Freq.: No  Dysuria or Hematuria: No  Fatigue/Achiness: YES  Sick/Strep Exposure: YES  Therapies tried and outcome: Tylenol, Delsom cough medicine.         Review of Systems  CONSTITUTIONAL:intermittent fevers   EYES: some clear drainage   ENT/MOUTH: postnasal drainage and sinus pressure  RESP:moist cough, SOB with activity   CV: NEGATIVE for chest pain, palpitations or peripheral edema  GI: NEGATIVE for nausea, abdominal pain, heartburn, or change in bowel habits  : denies dysuria   MUSCULOSKELETAL: body aches   NEURO: fatigued      Objective    BP (!) 150/80 (BP Location: Right arm, Patient Position: Sitting, Cuff Size: Adult Small)   Pulse 71   Temp 99.4  F (37.4  C) (Tympanic)   Ht 1.651 m (5' 5\")   Wt 63 kg (139 lb)   SpO2 98%   BMI 23.13 kg/m    Body mass index is 23.13 kg/m .  Physical Exam   {Exam List (Optional):936743}    {Diagnostic Test Results (Optional):383586}        Signed Electronically by: STEFAN Shearer " CNP  {Email feedback regarding this note to [email or web address]   :631988}

## 2025-03-10 ENCOUNTER — TELEPHONE (OUTPATIENT)
Dept: FAMILY MEDICINE | Facility: OTHER | Age: 80
End: 2025-03-10

## 2025-03-10 NOTE — TELEPHONE ENCOUNTER
1:55 PM    Reason for Call: Phone Call    Description: Patient called and states that she was in on 03- for bronchitis and was prescribed doxycycline. She states her symptoms are not getting any better and she is wondering if she can get a Z pack prescribed? Please let her know     Was an appointment offered for this call? No    Preferred method for responding to this message: Telephone Call  What is your phone number ?[phone]     If we cannot reach you directly, may we leave a detailed response at the number you provided? Yes    Can this message wait until your PCP/provider returns, if available today? Not applicable    Yolis Renner

## 2025-03-10 NOTE — TELEPHONE ENCOUNTER
Writer called and spoke with patient. Patient states she is having cough, sinus congestion, and still having green phlegm. Patient is scheduled for 3/11/2025 at 9:00 with PCP.

## 2025-07-04 ENCOUNTER — APPOINTMENT (OUTPATIENT)
Dept: GENERAL RADIOLOGY | Facility: HOSPITAL | Age: 80
End: 2025-07-04
Attending: STUDENT IN AN ORGANIZED HEALTH CARE EDUCATION/TRAINING PROGRAM
Payer: MEDICARE

## 2025-07-04 ENCOUNTER — HOSPITAL ENCOUNTER (EMERGENCY)
Facility: HOSPITAL | Age: 80
Discharge: HOME | End: 2025-07-04
Attending: NURSE PRACTITIONER
Payer: MEDICARE

## 2025-07-04 VITALS
DIASTOLIC BLOOD PRESSURE: 78 MMHG | HEART RATE: 62 BPM | TEMPERATURE: 98.9 F | RESPIRATION RATE: 16 BRPM | OXYGEN SATURATION: 96 % | SYSTOLIC BLOOD PRESSURE: 145 MMHG

## 2025-07-04 DIAGNOSIS — R53.83 FATIGUE: ICD-10-CM

## 2025-07-04 LAB
ANION GAP SERPL CALCULATED.3IONS-SCNC: 14 MMOL/L (ref 7–15)
BASOPHILS # BLD AUTO: 0 10E3/UL (ref 0–0.2)
BASOPHILS NFR BLD AUTO: 0 %
BUN SERPL-MCNC: 14.7 MG/DL (ref 8–23)
CALCIUM SERPL-MCNC: 9.8 MG/DL (ref 8.8–10.4)
CHLORIDE SERPL-SCNC: 103 MMOL/L (ref 98–107)
CREAT SERPL-MCNC: 0.96 MG/DL (ref 0.51–0.95)
EGFRCR SERPLBLD CKD-EPI 2021: 59 ML/MIN/1.73M2
EOSINOPHIL # BLD AUTO: 0.1 10E3/UL (ref 0–0.7)
EOSINOPHIL NFR BLD AUTO: 2 %
ERYTHROCYTE [DISTWIDTH] IN BLOOD BY AUTOMATED COUNT: 12.4 % (ref 10–15)
GLUCOSE SERPL-MCNC: 161 MG/DL (ref 70–99)
HCO3 SERPL-SCNC: 21 MMOL/L (ref 22–29)
HCT VFR BLD AUTO: 40.1 % (ref 35–47)
HGB BLD-MCNC: 13.6 G/DL (ref 11.7–15.7)
HOLD SPECIMEN: NORMAL
IMM GRANULOCYTES # BLD: 0 10E3/UL
IMM GRANULOCYTES NFR BLD: 0 %
LYMPHOCYTES # BLD AUTO: 1.3 10E3/UL (ref 0.8–5.3)
LYMPHOCYTES NFR BLD AUTO: 22 %
MAGNESIUM SERPL-MCNC: 1.8 MG/DL (ref 1.7–2.3)
MCH RBC QN AUTO: 31.4 PG (ref 26.5–33)
MCHC RBC AUTO-ENTMCNC: 33.9 G/DL (ref 31.5–36.5)
MCV RBC AUTO: 93 FL (ref 78–100)
MONOCYTES # BLD AUTO: 0.4 10E3/UL (ref 0–1.3)
MONOCYTES NFR BLD AUTO: 7 %
NEUTROPHILS # BLD AUTO: 4.2 10E3/UL (ref 1.6–8.3)
NEUTROPHILS NFR BLD AUTO: 69 %
NRBC # BLD AUTO: 0 10E3/UL
NRBC BLD AUTO-RTO: 0 /100
PLATELET # BLD AUTO: 278 10E3/UL (ref 150–450)
POTASSIUM SERPL-SCNC: 4.3 MMOL/L (ref 3.4–5.3)
RBC # BLD AUTO: 4.33 10E6/UL (ref 3.8–5.2)
SODIUM SERPL-SCNC: 138 MMOL/L (ref 135–145)
TROPONIN T SERPL HS-MCNC: 8 NG/L
TSH SERPL DL<=0.005 MIU/L-ACNC: 1.83 UIU/ML (ref 0.3–4.2)
WBC # BLD AUTO: 6.1 10E3/UL (ref 4–11)

## 2025-07-04 PROCEDURE — 83735 ASSAY OF MAGNESIUM: CPT | Performed by: NURSE PRACTITIONER

## 2025-07-04 PROCEDURE — 99285 EMERGENCY DEPT VISIT HI MDM: CPT | Mod: 25

## 2025-07-04 PROCEDURE — 93005 ELECTROCARDIOGRAM TRACING: CPT

## 2025-07-04 PROCEDURE — 96360 HYDRATION IV INFUSION INIT: CPT

## 2025-07-04 PROCEDURE — 80048 BASIC METABOLIC PNL TOTAL CA: CPT | Performed by: NURSE PRACTITIONER

## 2025-07-04 PROCEDURE — 71046 X-RAY EXAM CHEST 2 VIEWS: CPT | Mod: 26 | Performed by: RADIOLOGY

## 2025-07-04 PROCEDURE — 84443 ASSAY THYROID STIM HORMONE: CPT | Performed by: NURSE PRACTITIONER

## 2025-07-04 PROCEDURE — 99284 EMERGENCY DEPT VISIT MOD MDM: CPT | Performed by: NURSE PRACTITIONER

## 2025-07-04 PROCEDURE — 85025 COMPLETE CBC W/AUTO DIFF WBC: CPT | Performed by: NURSE PRACTITIONER

## 2025-07-04 PROCEDURE — 71046 X-RAY EXAM CHEST 2 VIEWS: CPT

## 2025-07-04 PROCEDURE — 258N000003 HC RX IP 258 OP 636: Performed by: NURSE PRACTITIONER

## 2025-07-04 PROCEDURE — 93010 ELECTROCARDIOGRAM REPORT: CPT | Performed by: INTERNAL MEDICINE

## 2025-07-04 PROCEDURE — 36415 COLL VENOUS BLD VENIPUNCTURE: CPT | Performed by: NURSE PRACTITIONER

## 2025-07-04 PROCEDURE — 84484 ASSAY OF TROPONIN QUANT: CPT | Performed by: NURSE PRACTITIONER

## 2025-07-04 RX ADMIN — SODIUM CHLORIDE, POTASSIUM CHLORIDE, SODIUM LACTATE AND CALCIUM CHLORIDE 1000 ML: 600; 310; 30; 20 INJECTION, SOLUTION INTRAVENOUS at 15:15

## 2025-07-04 ASSESSMENT — ENCOUNTER SYMPTOMS
PSYCHIATRIC NEGATIVE: 1
HEMATOLOGIC/LYMPHATIC NEGATIVE: 1
NEUROLOGICAL NEGATIVE: 1
CARDIOVASCULAR NEGATIVE: 1
EYES NEGATIVE: 1
NAUSEA: 1
ALLERGIC/IMMUNOLOGIC NEGATIVE: 1
FEVER: 0
MUSCULOSKELETAL NEGATIVE: 1
CONSTIPATION: 0
WHEEZING: 0
BLOOD IN STOOL: 0
CHILLS: 1
DIARRHEA: 0
ABDOMINAL PAIN: 0
FATIGUE: 1
COUGH: 0
STRIDOR: 0
ENDOCRINE NEGATIVE: 1
SHORTNESS OF BREATH: 1
VOMITING: 0

## 2025-07-04 ASSESSMENT — COLUMBIA-SUICIDE SEVERITY RATING SCALE - C-SSRS
6. HAVE YOU EVER DONE ANYTHING, STARTED TO DO ANYTHING, OR PREPARED TO DO ANYTHING TO END YOUR LIFE?: NO
1. IN THE PAST MONTH, HAVE YOU WISHED YOU WERE DEAD OR WISHED YOU COULD GO TO SLEEP AND NOT WAKE UP?: NO
2. HAVE YOU ACTUALLY HAD ANY THOUGHTS OF KILLING YOURSELF IN THE PAST MONTH?: NO

## 2025-07-04 ASSESSMENT — ACTIVITIES OF DAILY LIVING (ADL)
ADLS_ACUITY_SCORE: 41
ADLS_ACUITY_SCORE: 41

## 2025-07-04 NOTE — ED NOTES
Patient presents with c/o generalized fatigue since working in [employer] for 12 plus house on 6/30/25. Patient denies any pain, reports adequate fluid intake, denies diarrhea, constipation, emesis, fevers, or chills. Denies any chest pain or SOB.

## 2025-07-04 NOTE — ED TRIAGE NOTES
"Patient presents with \"feeling fatigued\" since June 30th when she was cleaning out her daughter's garage.   Feels like she is SOB, does not appear to be in respiratory distress.   Today is the first day she has been up and moving since the 30th, head fullness, hot/cold sweats.  Denies chest pain. Afebrile.  A&Ox4.    Last ate sloppy [employer] a couple hours ago.   Triage Assessment (Adult)       Row Name 07/04/25 1905          Triage Assessment    Airway WDL WDL        Respiratory WDL    Respiratory WDL WDL;expansion/retractions;rhythm/pattern     Rhythm/Pattern, Respiratory unlabored;pattern regular;depth regular;shortness of breath     Expansion/Accessory Muscles/Retractions no use of accessory muscles;no retractions;expansion symmetric        Cardiac WDL    Cardiac WDL WDL  Denies chest pain/pressure/tightness/heaviness.        Peripheral/Neurovascular WDL    Peripheral Neurovascular WDL WDL        Cognitive/Neuro/Behavioral WDL    Cognitive/Neuro/Behavioral WDL WDL                     "

## 2025-07-04 NOTE — DISCHARGE INSTRUCTIONS
Hydrate:   During this time it is important to keep well hydrated with juices or sports drinks.  Using 1/2 strength G2, Gatorade, or PowerAde.  DO NOT use caffeinated or alcoholic beverages for hydration, as these actually dehydrate you.       Follow-up with your primary care provider for reevaluation.  Contact your primary care provider if you have any questions or concerns.  Do not hesitate to return to the ER if any new or worsening symptoms.     Please read the attached instructions (if any).  They highlight more specific treatments and interventions for you at home.              Thank you for letting me participate in your care and wish you a fast and uneventful recovery,    Arthur AVILES, CNP    Do not hesitate to contact me with questions or concerns.  [email or web address]

## 2025-07-04 NOTE — ED PROVIDER NOTES
History     Chief Complaint   Patient presents with    Fatigue     HPI  Freya Lee is a 80 year old individual with history of depression/anxiety, hypothyroidism, hypertension, comes in for fatigue.  Patient states that she cleaned out her garage for 12 hours on 6/30/2025.  Since then states has been sleeping most of the days with fatigue.  Has had nausea but no vomiting.  No headache reported.  No fever.  Has had the chills.  No diarrhea or constipation.  No dysuria.  No abdominal pain.  Has been drinking fluids.  No chest pain.  Has had some shortness of breath.    Allergies:  Allergies   Allergen Reactions    Lisinopril Cough    Penicillins Angioedema     Oral edema, no shortness of breath or wheezing    Sulfa Antibiotics     Vancomycin Other (See Comments)     Cash syndrome.       Problem List:    Patient Active Problem List    Diagnosis Date Noted    Current mild episode of major depressive disorder without prior episode 09/03/2024     Priority: Medium    Primary open angle glaucoma of left eye 08/17/2023     Priority: Medium    Posterior vitreous detachment 06/03/2023     Priority: Medium    Biliary colic 02/24/2021     Priority: Medium     Formatting of this note might be different from the original.   Added automatically from request for surgery [id number]      Acute pain of left shoulder 04/23/2018     Priority: Medium    Muscle weakness 04/23/2018     Priority: Medium    Neck pain 04/23/2018     Priority: Medium    Post concussion syndrome 02/08/2018     Priority: Medium    Concussion 11/16/2017     Priority: Medium    Scalp laceration 11/16/2017     Priority: Medium    Abnormal CBC 04/25/2017     Priority: Medium    Fracture of 5th metatarsal 04/29/2015     Priority: Medium    Nondisplaced fracture of tibial tuberosity 01/07/2013     Priority: Medium    Health care maintenance 12/03/2012     Priority: Medium     Colonoscopy in 11/09, normal , next one in 10 yrs, Dr. Bustamante      Depression with anxiety  11/08/2012     Priority: Medium    Dermatitis 05/17/2012     Priority: Medium    Allergic rhinitis 01/05/2012     Priority: Medium     No meds      Hypothyroidism 01/05/2012     Priority: Medium     IMO Update 10/11  (see OV encounter dated 11/16/12 ... MRACELINO MINOR RN, 3/16/2013 6:36 AM )      Vitamin D deficiency 01/05/2012     Priority: Medium    Rosacea 06/15/2011     Priority: Medium    Other abnormal glucose 11/25/2008     Priority: Medium    Anemia, unspecified 07/22/2008     Priority: Medium     Post op after hip replacement.      Unspecified glaucoma 04/08/2008     Priority: Medium    Aftercare for healing traumatic fracture of lower arm 01/28/2008     Priority: Medium        Past Medical History:    No past medical history on file.    Past Surgical History:    No past surgical history on file.    Family History:    No family history on file.    Social History:  Marital Status:  Single [1]  Social History     Tobacco Use    Smoking status: Never     Passive exposure: Never    Smokeless tobacco: Never   Vaping Use    Vaping status: Never Used        Medications:    cholecalciferol (VITAMIN D3) 125 mcg (5000 units) capsule  dorzolamide-timolol (COSOPT) 2-0.5 % ophthalmic solution  fluticasone (FLONASE) 50 MCG/ACT nasal spray  latanoprost (XALATAN) 0.005 % ophthalmic solution  levothyroxine (SYNTHROID/LEVOTHROID) 50 MCG tablet  losartan (COZAAR) 50 MG tablet  methocarbamol (ROBAXIN) 500 MG tablet  Multiple Vitamins-Minerals (EYE VITAMINS PO)  triamcinolone (KENALOG) 0.1 % external cream  Turmeric, Curcuma Longa, (CURCUMIN EXTRACT) POWD          Review of Systems   Constitutional:  Positive for chills and fatigue. Negative for fever.   HENT: Negative.     Eyes: Negative.    Respiratory:  Positive for shortness of breath. Negative for cough, wheezing and stridor.    Cardiovascular: Negative.    Gastrointestinal:  Positive for nausea. Negative for abdominal pain, blood in stool, constipation, diarrhea and  vomiting.   Endocrine: Negative.    Genitourinary: Negative.    Musculoskeletal: Negative.    Skin: Negative.    Allergic/Immunologic: Negative.    Neurological: Negative.    Hematological: Negative.    Psychiatric/Behavioral: Negative.         Physical Exam   BP: (!) 160/92  Pulse: 95  Temp: 98.9  F (37.2  C)  Resp: 20  SpO2: 97 %      GENERAL APPEARANCE:  The patient is a 80 year old well-developed, well-nourished individual that appears as stated age.  NECK:  Supple.  Trachea is midline.  No carotid bruits present on auscultation.  LUNGS:  Breathing is easy.  Breath sounds are equal and clear bilaterally.  No wheezes, rhonchi, or rales.  HEART:  Regular rate and rhythm with normal S1 and S2.  No murmurs, gallops, or rubs.  ABDOMEN:  Soft.  No mass, tenderness, guarding, or rebound.  No organomegaly or hernia.  Bowel sounds are present.  No CVA tenderness or flank mass.  No abdominal bruits or thrills present upon auscultation/palpation.  NEUROLOGIC:  No focal sensory or motor deficits are noted.  PSYCHIATRIC:  The patient is awake, alert, and oriented x4.  Recent and remote memory is intact.  Appropriate mood and affect.  Calm and cooperative with history and physical exam.  SKIN:  Warm, dry, and well perfused.  Good turgor.  No lesions, nodules, or rashes are noted.  No bruising noted.       ED Course     ED Course as of 07/04/25 1612   Fri Jul 04, 2025   1424 Chest x-ray ordered.   1433 In to see patient and history/physical completed.    1442 Labs and ECG ordered.   1540 Troponin T, High Sensitivity: 8   1608 Patient feeling better after IV fluids.  Has labs and imaging along with ECG showing no acute abnormality.  Will discharge home.  Follow-up recommendations and return precautions given.            ECG:    ECG competed at 1450 and personally reviewed at 1455 showing sinus rhythm with ventricular rate of 75 and QTc of 426.  Left axis deviation present.  Abnormal ECG.  When compared to ECG from 10/29/2024,  no significant changes noted.         Recent Results (from the past 24 hours)   Chest XR,  PA & LAT    Narrative    EXAM: XR CHEST 2 VIEWS  LOCATION: [facility]  DATE: 7/4/2025    INDICATION: Shortness of breath. Fatigue.  COMPARISON: 3/5/2025.      Impression    IMPRESSION: Negative chest.   [place] Draw    Narrative    The following orders were created for panel order [place] Draw.  Procedure                               Abnormality         Status                     ---------                               -----------         ------                     Extra Blue Top Tube[[id number]]                             Final result               Extra Red Top Tube[[id number]]                              Final result               Extra Green Top (Lithiu...[[id number]]                      Final result               Extra Purple Top Tube[[id number]]                           In process                 Extra Green Top (Lithiu...[[id number]]                      In process                   Please view results for these tests on the individual orders.   CBC with platelets differential    Narrative    The following orders were created for panel order CBC with platelets differential.  Procedure                               Abnormality         Status                     ---------                               -----------         ------                     CBC with platelets and ...[[id number]]                      Final result                 Please view results for these tests on the individual orders.   Basic metabolic panel   Result Value Ref Range    Sodium 138 135 - 145 mmol/L    Potassium 4.3 3.4 - 5.3 mmol/L    Chloride 103 98 - 107 mmol/L    Carbon Dioxide (CO2) 21 (L) 22 - 29 mmol/L    Anion Gap 14 7 - 15 mmol/L    Urea Nitrogen 14.7 8.0 - 23.0 mg/dL    Creatinine 0.96 (H) 0.51 - 0.95 mg/dL    GFR Estimate 59 (L) >60 mL/min/1.73m2    Calcium 9.8 8.8 - 10.4 mg/dL    Glucose 161 (H) 70 - 99 mg/dL    Troponin T, High Sensitivity   Result Value Ref Range    Troponin T, High Sensitivity 8 <=14 ng/L   Magnesium   Result Value Ref Range    Magnesium 1.8 1.7 - 2.3 mg/dL   TSH with free T4 reflex   Result Value Ref Range    TSH 1.83 0.30 - 4.20 uIU/mL   Extra Blue Top Tube   Result Value Ref Range    Hold Specimen JIC    Extra Red Top Tube   Result Value Ref Range    Hold Specimen JIC    Extra Green Top (Lithium Heparin) Tube   Result Value Ref Range    Hold Specimen JIC    CBC with platelets and differential   Result Value Ref Range    WBC Count 6.1 4.0 - 11.0 10e3/uL    RBC Count 4.33 3.80 - 5.20 10e6/uL    Hemoglobin 13.6 11.7 - 15.7 g/dL    Hematocrit 40.1 35.0 - 47.0 %    MCV 93 78 - 100 fL    MCH 31.4 26.5 - 33.0 pg    MCHC 33.9 31.5 - 36.5 g/dL    RDW 12.4 10.0 - 15.0 %    Platelet Count 278 150 - 450 10e3/uL    % Neutrophils 69 %    % Lymphocytes 22 %    % Monocytes 7 %    % Eosinophils 2 %    % Basophils 0 %    % Immature Granulocytes 0 %    NRBCs per 100 WBC 0 <1 /100    Absolute Neutrophils 4.2 1.6 - 8.3 10e3/uL    Absolute Lymphocytes 1.3 0.8 - 5.3 10e3/uL    Absolute Monocytes 0.4 0.0 - 1.3 10e3/uL    Absolute Eosinophils 0.1 0.0 - 0.7 10e3/uL    Absolute Basophils 0.0 0.0 - 0.2 10e3/uL    Absolute Immature Granulocytes 0.0 <=0.4 10e3/uL    Absolute NRBCs 0.0 10e3/uL   EKG 12-lead, tracing only   Result Value Ref Range    Systolic Blood Pressure  mmHg    Diastolic Blood Pressure  mmHg    Ventricular Rate 75 BPM    Atrial Rate 75 BPM    AR Interval 148 ms    QRS Duration 90 ms     ms    QTc 426 ms    P Axis 63 degrees    R AXIS -36 degrees    T Axis 14 degrees    Interpretation ECG       Sinus rhythm  Left axis deviation  Abnormal ECG  When compared with ECG of 29-Oct-2024 12:47,  No significant change was found         Medications   lactated ringers BOLUS 1,000 mL (0 mLs Intravenous Stopped 7/4/25 1610)       Assessments & Plan (with Medical Decision Making)     I have reviewed the nursing  notes.    I have reviewed the findings, diagnosis, plan and need for follow up with the patient.    Summary:  Patient presents to the ER today for fatigue.  Potential diagnosis which have been considered and evaluated include MI/NSTEMI, electrolyte abnormality, UTI, metabolic abnormality, as well as others. Many of these have been excluded using the various modalities and assessment as noted on the chart. At the present time, the diagnosis is fatigue.  Upon arrival, vitals signs show blood pressure 160/92 with a pulse of 95.  Temperature 98.9  F.  Respirations 20 with oxygenation 97% on room air.  The patient is alert and oriented.  Cardiac and respiratory examination normal.  No abdominal tenderness or anterior pelvic tenderness noted to palpation.  Neurological examination normal.  ECG obtained showing no acute abnormalities.  Lab work obtained showing WBC of 6.1 with hemoglobin of 13.6.  Electrolytes normal.  Does have a BUN of 14.7 with a creatinine of 0.96 and GFR 59 which is slightly worse than baseline when compared to previous lab work.  TSH 1.83.  High-sensitivity troponin 8 making ACS unlikely as this has been going on for 4 days.  UA showed not obtained.  Chest x-ray personally reviewed showing no acute cardiopulmonary abnormalities.    1 L LR given for hydration.  Patient felt better after this.  As labs, imaging, ECG normal with normal vital signs will discharge home.  Continue hydration at home recommended.  Follow-up with PCP and return to ER if noticing symptoms.  Patient verbalized understand agrees plan of care.  Patient discharged home.        Critical Care Time: None    Impression and plan discussed with patient. Questions answered, concerns addressed, indications for urgent re-evaluation reviewed, and  given. Patient/Parent/Caregiver agree with treatment plan and have no further questions at this time.  AVS provided at discharge.    This document was prepared using a combination of typing  and voice generated software.  While every attempt was made for accuracy, spelling and grammatical errors may exist.              New Prescriptions    No medications on file       Final diagnoses:   Fatigue       7/4/2025   [facility]       Arthur Villalobos APRN CNP  07/04/25 [id number]

## 2025-07-05 LAB
ATRIAL RATE - MUSE: 75 BPM
DIASTOLIC BLOOD PRESSURE - MUSE: NORMAL MMHG
INTERPRETATION ECG - MUSE: NORMAL
P AXIS - MUSE: 63 DEGREES
PR INTERVAL - MUSE: 148 MS
QRS DURATION - MUSE: 90 MS
QT - MUSE: 382 MS
QTC - MUSE: 426 MS
R AXIS - MUSE: -36 DEGREES
SYSTOLIC BLOOD PRESSURE - MUSE: NORMAL MMHG
T AXIS - MUSE: 14 DEGREES
VENTRICULAR RATE- MUSE: 75 BPM

## (undated) RX ORDER — LIDOCAINE HYDROCHLORIDE AND EPINEPHRINE 10; 10 MG/ML; UG/ML
INJECTION, SOLUTION INFILTRATION; PERINEURAL
Status: DISPENSED
Start: 2024-12-16